# Patient Record
Sex: FEMALE | Race: ASIAN | NOT HISPANIC OR LATINO | Employment: UNEMPLOYED | URBAN - METROPOLITAN AREA
[De-identification: names, ages, dates, MRNs, and addresses within clinical notes are randomized per-mention and may not be internally consistent; named-entity substitution may affect disease eponyms.]

---

## 2017-01-24 ENCOUNTER — OFFICE VISIT (OUTPATIENT)
Dept: NEUROLOGY | Facility: CLINIC | Age: 42
End: 2017-01-24
Payer: COMMERCIAL

## 2017-01-24 VITALS
SYSTOLIC BLOOD PRESSURE: 96 MMHG | OXYGEN SATURATION: 99 % | DIASTOLIC BLOOD PRESSURE: 73 MMHG | WEIGHT: 117.3 LBS | BODY MASS INDEX: 21.59 KG/M2 | HEART RATE: 72 BPM | HEIGHT: 62 IN

## 2017-01-24 DIAGNOSIS — M79.2 NEUROPATHIC PAIN: Primary | ICD-10-CM

## 2017-01-24 DIAGNOSIS — S14.105D: ICD-10-CM

## 2017-01-24 PROCEDURE — 99212 OFFICE O/P EST SF 10 MIN: CPT | Performed by: PSYCHIATRY & NEUROLOGY

## 2017-01-24 RX ORDER — CEFUROXIME AXETIL 250 MG/1
250 TABLET ORAL
COMMUNITY
Start: 2017-01-16 | End: 2017-05-04

## 2017-01-24 ASSESSMENT — PAIN SCALES - GENERAL: PAINLEVEL: EXTREME PAIN (8)

## 2017-01-24 NOTE — PATIENT INSTRUCTIONS
Thank you for choosing [facility] in [place]. It was a pleasure to see you for your office visit today.     The following is a summary of your office visit:    Medication started today: None, Dr Day will talk with your cardiologist about restarting the Lidoderm Infusions.     Medication stopped today: None    Medication dose change: Decrease the Zonegran to 3 pills a day for 1 month and if doing well you can drop to 2 pills per day. If your pain increases you may go back up to original dose.     Appointments Made today: You are scheduled for a follow up with Dr Day in 4 months, this appt has been made for you.     Nurse/clinic contact information: [facility] [phone]    Further instructions for your care: Make sure that you make a follow up appt to see your Counselor. Call if your symptoms change or worsen.

## 2017-01-24 NOTE — MR AVS SNAPSHOT
After Visit Summary   1/24/2017    Kely Preston    MRN: [id number]           Patient Information     Date Of Birth          1975        Visit Information        Provider Department      1/24/2017 10:00 AM Fredi Day MD [facility]        Care Instructions    Thank you for choosing [facility] in [place]. It was a pleasure to see you for your office visit today.     The following is a summary of your office visit:    Medication started today: None, Dr Day will talk with your cardiologist about restarting the Lidoderm Patches.     Medication stopped today: None    Medication dose change: Decrease the Zonegran to 3 pills a day for 1 month and if doing well you can drop to 2 pills per day. If your pain increases you may go back up to original dose.     Appointments Made today: You are scheduled for a follow up with Dr Day in 4 months, this appt has been made for you.     Nurse/clinic contact information: [facility] [phone]    Further instructions for your care: Make sure that you make a follow up appt to see your Counselor. Call if your symptoms change or worsen.              Follow-ups after your visit        Your next 10 appointments already scheduled     May 23, 2017 10:00 AM   Return Visit with Fredi Day MD   [facility] ([facility])    [address] [place] [address] [phone]              Who to contact     If you have questions or need follow up information about today's clinic visit or your schedule please contact [facility] directly at [phone].  Normal or non-critical lab and imaging results will be communicated to you by MyChart, letter or phone within 4 business days after the clinic has received the results. If you do not hear from us within 7 days, please contact the clinic through MyChart or phone. If you have a critical or abnormal lab  "result, we will notify you by phone as soon as possible.  Submit refill requests through [employer] or call your pharmacy and they will forward the refill request to us. Please allow 3 business days for your refill to be completed.          Additional Information About Your Visit        [employer] Information     [employer] is an electronic gateway that provides easy, online access to your medical records. With [employer], you can request a clinic appointment, read your test results, renew a prescription or communicate with your care team.     To sign up for [employer] visit the website at [email or web address]   You will be asked to enter the access code listed below, as well as some personal information. Please follow the directions to create your username and password.     Your access code is: [email or web address]  Expires: 2017  5:03 PM     Your access code will  in 90 days. If you need help or a new code, please contact your [facility] or call [phone] for assistance.        Care EveryWhere ID     This is your Care EveryWhere ID. This could be used by other organizations to access your [place] medical records  [id number]        Your Vitals Were     Pulse Height BMI (Body Mass Index) Pulse Oximetry          72 5' 2\" (1.575 m) 21.45 kg/m2 99%         Blood Pressure from Last 3 Encounters:   17 96/73   12/15/16 102/68   11/15/16 106/72    Weight from Last 3 Encounters:   17 117 lb 4.8 oz (53.207 kg)   12/15/16 122 lb 8 oz (55.566 kg)   11/15/16 121 lb 8 oz (55.112 kg)              Today, you had the following     No orders found for display       Primary Care Provider Office Phone # Fax #    Kenneth G Pallas, [phone]       [facility] [id number]  [facility] [address]        Thank you!     Thank you for choosing [facility]  for your care. Our goal is always to provide you with excellent care. Hearing back " from our patients is one way we can continue to improve our services. Please take a few minutes to complete the written survey that you may receive in the mail after your visit with us. Thank you!             Your Updated Medication List - Protect others around you: Learn how to safely use, store and throw away your medicines at www.disposemymeds.org.          This list is accurate as of: 1/24/17 11:09 AM.  Always use your most recent med list.                   Brand Name Dispense Instructions for use    CEFTIN 250 MG tablet   Generic drug:  cefUROXime      Take 250 mg by mouth       DITROPAN XL 10 MG 24 hr tablet   Generic drug:  oxybutynin      Take by mouth daily       EFFEXOR XR 75 MG 24 hr capsule   Generic drug:  venlafaxine      Take 75 mg by mouth daily       gabapentin 300 MG capsule    NEURONTIN    360 capsule    Take 3 capsules (900 mg) by mouth 4 times daily       indomethacin 50 MG capsule    INDOCIN     Take 50 mg by mouth 2 times daily (with meals) Take up to 3 tablets per day for headache pain.       klonoPIN 0.5 MG tablet   Generic drug:  clonazePAM      Take 0.5 mg by mouth 2 times daily as needed       OXYBUTYNIN TD      Take 5 mg by mouth At Bedtime 5 mg at bedtime as needed.       oxyCODONE-acetaminophen  MG per tablet    PERCOCET     Take 1 tablet by mouth every 6 hours as needed for moderate to severe pain       propranolol 60 MG 24 hr capsule    INDERAL LA    30 capsule    Take 1 capsule (60 mg) by mouth daily       tiZANidine 4 MG tablet    ZANAFLEX    60 tablet    Take 1 tablet (4 mg) by mouth 2 times daily as needed       traZODone 150 MG tablet    DESYREL     Take 150 mg by mouth At Bedtime       zonisamide 100 MG capsule    ZONEGRAN    120 capsule    Take 4 capsules (400 mg) by mouth daily

## 2017-01-24 NOTE — NURSING NOTE
"Kely Pedersenearl's goals for this visit include: return  She requests these members of her care team be copied on today's visit information:     PCP: Pallas, Kenneth G    Referring Provider:  ESTABLISHED PATIENT  No address on file    Chief Complaint   Patient presents with     RECHECK       Initial BP 96/73 mmHg  Pulse 72  Ht 1.575 m (5' 2\")  Wt 53.207 kg (117 lb 4.8 oz)  BMI 21.45 kg/m2  SpO2 99% Estimated body mass index is 21.45 kg/(m^2) as calculated from the following:    Height as of this encounter: 1.575 m (5' 2\").    Weight as of this encounter: 53.207 kg (117 lb 4.8 oz).  BP completed using cuff size: regular      "

## 2017-01-24 NOTE — Clinical Note
"2017      RE: Kely Preston  [address] [place] [address]      2017            Kenneth Pallas, MD   [address] [address]      RE:  Kely Preston   MRN:  [id number]   :  1975      Dear Doctors:      I saw Kely \"Rema\" Kary in followup at the [facility] at [place].  As you know, I have followed her periodically for management of a neuropathic pain syndrome involving the right upper limb.  Since I saw her last in the spring of last year, symptoms have persisted and possibly worsened somewhat recently, due in part to the physical requirements of her employment as well as having discontinued her lidocaine infusions because of some cardiac symptoms.  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  She has been placed on a beta blocker which has helped with palpitations as well as migraine.  She has no clear new specific neuromuscular symptoms.      EXAMINATION:  Perception of light touch is reduced in the right upper extremity and right foot dorsum.  Pinprick perception is variably reduced, particularly in the right upper extremity, although it is better preserved over the lateral arm.  Vibration scores are 5 at the right malleolus and 5 at the left great toe.  Tone, bulk, strength and rapid repetitive movements are preserved.  Reflexes are symmetric and preserved except for modest relative reduction of the right biceps reflex and absence of the left triceps reflex.  Plantar responses are mute.      Kely's examination demonstrates no new concerning findings.  I will contact Dr. Amaya to determine whether she is cleared to resume intravenous lidocaine infusions.  As she is not confident that our increase her in Zonegran has been beneficial, I have suggested she begin to taper it back down to 200 mg daily.  She will return in 4 months.       "   Sincerely,      FREDI ABARCA MD             D: 2017 18:14   T: 2017 09:52   MT:       Name:     ALEXANDRE HUGGINS   MRN:      [id number]        Account:      [id number]   :      1975      Document: [id number]       cc: Andrew Will MD Constance Dankle DO Jessica Olson Heiring MD Kenneth Pallas MD     [id number]    Fredi Abarca MD

## 2017-01-25 NOTE — PROGRESS NOTES
"2017            Kenneth Pallas, MD   [facility]   [address] [place] [address]      RE:  Kely Preston   MRN:  [id number]   :  1975      Dear Doctors:      I saw Kely \"Rema\" Kary in followup at the [facility] [place].  As you know, I have followed her periodically for management of a neuropathic pain syndrome involving the right upper limb.  Since I saw her last in the spring of last year, symptoms have persisted and possibly worsened somewhat recently, due in part to the physical requirements of her employment as well as having discontinued her lidocaine infusions because of some cardiac symptoms.  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  She has been placed on a beta blocker which has helped with palpitations as well as migraine.  She has no clear new specific neuromuscular symptoms.      EXAMINATION:  Perception of light touch is reduced in the right upper extremity and right foot dorsum.  Pinprick perception is variably reduced, particularly in the right upper extremity, although it is better preserved over the lateral arm.  Vibration scores are 5 at the right malleolus and 5 at the left great toe.  Tone, bulk, strength and rapid repetitive movements are preserved.  Reflexes are symmetric and preserved except for modest relative reduction of the right biceps reflex and absence of the left triceps reflex.  Plantar responses are mute.      Kely's examination demonstrates no new concerning findings.  I will contact Dr. Amaya to determine whether she is cleared to resume intravenous lidocaine infusions.  As she is not confident that our increase her in Zonegran has been beneficial, I have suggested she begin to taper it back down to 200 mg daily.  She will return in 4 months.         Sincerely,      GIA ABARCA MD             D: 2017 18:14   T: 2017 09:52   " MT:       Name:     ALEXANDRE HUGGINS   MRN:      [id number]        Account:      [id number]   :      1975      Document: [id number]       cc: Andrew Will MD Constance Dankle DO Jessica Olson Heiring MD Kenneth Pallas MD

## 2017-02-06 ENCOUNTER — DOCUMENTATION ONLY (OUTPATIENT)
Dept: NEUROLOGY | Facility: CLINIC | Age: 42
End: 2017-02-06

## 2017-02-06 NOTE — PROGRESS NOTES
Received message from Dr Amaya that Kely Kary should NOT resume lidocaine infusions for now. Her office will arrange a cardiology followup appointment to discuss options. I will forward to coordinator and infusion center.

## 2017-02-06 NOTE — PROGRESS NOTES
The therapy plan was discontinued per Dr Day's request and the pt was notified that she should NOT proceed with the Lidocaine infusions. The pt verbalized understanding and had no further questions at this time.  Kely Hopkins RN

## 2017-05-03 DIAGNOSIS — F43.20 ADJUSTMENT DISORDER, UNSPECIFIED TYPE: ICD-10-CM

## 2017-05-03 DIAGNOSIS — R00.2 PALPITATIONS: ICD-10-CM

## 2017-05-03 DIAGNOSIS — G43.909 MIGRAINE WITHOUT STATUS MIGRAINOSUS, NOT INTRACTABLE, UNSPECIFIED MIGRAINE TYPE: ICD-10-CM

## 2017-05-03 RX ORDER — PROPRANOLOL HCL 60 MG
60 CAPSULE, EXTENDED RELEASE 24HR ORAL DAILY
Qty: 90 CAPSULE | Refills: 0 | Status: SHIPPED | OUTPATIENT
Start: 2017-05-03 | End: 2017-05-04

## 2017-05-03 NOTE — TELEPHONE ENCOUNTER
Received refill request for:  Propranolol  Last OV was: 12/15/2016 with Tejal HAINES  Labs/EKG: n/a  F/U scheduled: Entered orders in Epic for 6/2017 after reviewing Neurology note from 2/6/2017  New script sent to: [facility]

## 2017-05-04 ENCOUNTER — OFFICE VISIT (OUTPATIENT)
Dept: CARDIOLOGY | Facility: CLINIC | Age: 42
End: 2017-05-04
Attending: PHYSICIAN ASSISTANT
Payer: COMMERCIAL

## 2017-05-04 VITALS
HEIGHT: 62 IN | DIASTOLIC BLOOD PRESSURE: 80 MMHG | SYSTOLIC BLOOD PRESSURE: 108 MMHG | WEIGHT: 123 LBS | BODY MASS INDEX: 22.63 KG/M2 | HEART RATE: 60 BPM

## 2017-05-04 DIAGNOSIS — G43.909 MIGRAINE WITHOUT STATUS MIGRAINOSUS, NOT INTRACTABLE, UNSPECIFIED MIGRAINE TYPE: ICD-10-CM

## 2017-05-04 DIAGNOSIS — R00.2 PALPITATIONS: ICD-10-CM

## 2017-05-04 DIAGNOSIS — F43.20 ADJUSTMENT DISORDER, UNSPECIFIED TYPE: ICD-10-CM

## 2017-05-04 PROCEDURE — 99214 OFFICE O/P EST MOD 30 MIN: CPT | Performed by: INTERNAL MEDICINE

## 2017-05-04 RX ORDER — PROPRANOLOL HCL 60 MG
60 CAPSULE, EXTENDED RELEASE 24HR ORAL DAILY
Qty: 90 CAPSULE | Refills: 4 | Status: SHIPPED | OUTPATIENT
Start: 2017-05-04 | End: 2019-03-15

## 2017-05-04 NOTE — MR AVS SNAPSHOT
"              After Visit Summary   5/4/2017    Kely Preston    MRN: [id number]           Patient Information     Date Of Birth          1975        Visit Information        Provider Department      5/4/2017 1:45 PM Izabella Amaya DO [facility] [place]        Today's Diagnoses     Palpitations        Migraine without status migrainosus, not intractable, unspecified migraine type        Adjustment disorder, unspecified type           Follow-ups after your visit        Your next 10 appointments already scheduled     May 23, 2017 10:00 AM CDT   Return Visit with Fredi Day MD   [facility]    [address] [place] [address] [phone]              Who to contact     If you have questions or need follow up information about today's clinic visit or your schedule please contact [facility] [place] directly at [phone].  Normal or non-critical lab and imaging results will be communicated to you by MyChart, letter or phone within 4 business days after the clinic has received the results. If you do not hear from us within 7 days, please contact the clinic through MyChart or phone. If you have a critical or abnormal lab result, we will notify you by phone as soon as possible.  Submit refill requests through [email or web address] or call your pharmacy and they will forward the refill request to us. Please allow 3 business days for your refill to be completed.          Additional Information About Your Visit        [employer] Information     [email or web address] lets you send messages to your doctor, view your test results, renew your prescriptions, schedule appointments and more. To sign up, go to [email or web address] . Click on \"Log in\" on the left side of the screen, which will take you to the Welcome page. Then click on \"Sign up Now\" on the right side of the page.     You will be asked to " "enter the access code listed below, as well as some personal information. Please follow the directions to create your username and password.     Your access code is: [email or web address]  Expires: 2017  2:25 PM     Your access code will  in 90 days. If you need help or a new code, please call your [place] clinic or [phone].        Care EveryWhere ID     This is your Care EveryWhere ID. This could be used by other organizations to access your [place] medical records  [id number]        Your Vitals Were     Pulse Height BMI (Body Mass Index)             60 1.575 m (5' 2\") 22.5 kg/m2          Blood Pressure from Last 3 Encounters:   17 108/80   17 96/73   12/15/16 102/68    Weight from Last 3 Encounters:   17 55.8 kg (123 lb)   17 53.2 kg (117 lb 4.8 oz)   12/15/16 55.6 kg (122 lb 8 oz)              We Performed the Following     Follow-Up with Cardiologist          Where to get your medicines      These medications were sent to [facility] [address] [address]  [address] [address], [facility] [address]     Phone:  [phone]     propranolol 60 MG 24 hr capsule          Primary Care Provider Office Phone # Fax #    Kenneth G Pallas, [phone]       [facility] [address] [facility] [address]        Thank you!     Thank you for choosing [facility] [place]  for your care. Our goal is always to provide you with excellent care. Hearing back from our patients is one way we can continue to improve our services. Please take a few minutes to complete the written survey that you may receive in the mail after your visit with us. Thank you!             Your Updated Medication List - Protect others around you: Learn how to safely use, store and throw away your medicines at www.disposemymeds.org.          This list is accurate as of: 17  2:25 PM.  Always use your most recent " med list.                   Brand Name Dispense Instructions for use    DITROPAN XL 10 MG 24 hr tablet   Generic drug:  oxybutynin      Take by mouth daily       EFFEXOR XR 75 MG 24 hr capsule   Generic drug:  venlafaxine      Take 225 mg by mouth daily       gabapentin 300 MG capsule    NEURONTIN    360 capsule    Take 3 capsules (900 mg) by mouth 4 times daily       indomethacin 50 MG capsule    INDOCIN     Take 50 mg by mouth as needed Take up to 3 tablets per day for headache pain       klonoPIN 0.5 MG tablet   Generic drug:  clonazePAM      Take 0.5 mg by mouth 2 times daily as needed       OXYBUTYNIN TD      Take 5 mg by mouth At Bedtime 5 mg at bedtime as needed.       oxyCODONE-acetaminophen  MG per tablet    PERCOCET     Take 1 tablet by mouth every 6 hours as needed for moderate to severe pain       propranolol 60 MG 24 hr capsule    INDERAL LA    90 capsule    Take 1 capsule (60 mg) by mouth daily       tiZANidine 4 MG tablet    ZANAFLEX    60 tablet    Take 1 tablet (4 mg) by mouth 2 times daily as needed       traZODone 150 MG tablet    DESYREL     Take 150 mg by mouth At Bedtime

## 2017-05-04 NOTE — PROGRESS NOTES
HPI and Plan:   See dictation    No orders of the defined types were placed in this encounter.      Orders Placed This Encounter   Medications     propranolol (INDERAL LA) 60 MG 24 hr capsule     Sig: Take 1 capsule (60 mg) by mouth daily     Dispense:  90 capsule     Refill:  4       Medications Discontinued During This Encounter   Medication Reason     cefUROXime (CEFTIN) 250 MG tablet Therapy completed     zonisamide (ZONEGRAN) 100 MG capsule Discontinued by another Health Care Provider     propranolol (INDERAL LA) 60 MG 24 hr capsule Reorder         Encounter Diagnoses   Name Primary?     Palpitations      Migraine without status migrainosus, not intractable, unspecified migraine type      Adjustment disorder, unspecified type        CURRENT MEDICATIONS:  Current Outpatient Prescriptions   Medication Sig Dispense Refill     propranolol (INDERAL LA) 60 MG 24 hr capsule Take 1 capsule (60 mg) by mouth daily 90 capsule 4     venlafaxine (EFFEXOR XR) 75 MG 24 hr capsule Take 225 mg by mouth daily        gabapentin (NEURONTIN) 300 MG capsule Take 3 capsules (900 mg) by mouth 4 times daily 360 capsule 5     OXYBUTYNIN TD Take 5 mg by mouth At Bedtime 5 mg at bedtime as needed.       oxyCODONE-acetaminophen (PERCOCET)  MG per tablet Take 1 tablet by mouth every 6 hours as needed for moderate to severe pain       oxybutynin (DITROPAN XL) 10 MG 24 hr tablet Take by mouth daily       tiZANidine (ZANAFLEX) 4 MG tablet Take 1 tablet (4 mg) by mouth 2 times daily as needed 60 tablet 1     clonazePAM (KLONOPIN) 0.5 MG tablet Take 0.5 mg by mouth 2 times daily as needed        indomethacin (INDOCIN) 50 MG capsule Take 50 mg by mouth as needed Take up to 3 tablets per day for headache pain       [DISCONTINUED] propranolol (INDERAL LA) 60 MG 24 hr capsule Take 1 capsule (60 mg) by mouth daily 90 capsule 0     traZODone (DESYREL) 150 MG tablet Take 150 mg by mouth At Bedtime         ALLERGIES     Allergies   Allergen  "Reactions     Topamax [Topiramate]      shaky       PAST MEDICAL HISTORY:  Past Medical History:   Diagnosis Date     Anxiety      Chronic pain syndrome      Depressive disorder      Irregular heart beat      Migraine      Myelomalacia (H)      Neuropathy (H)      Other chronic pain      Unspecified site of spinal cord injury without evidence of spinal bone injury        PAST SURGICAL HISTORY:  Past Surgical History:   Procedure Laterality Date     CARDIAC SURGERY      ablation for arrhythmia     GYN SURGERY      c section x2     IMPLANT STIMULATOR AND LEADS SACRAL NERVE (STAGE ONE AND TWO)  7/30/2014    Procedure: IMPLANT STIMULATOR AND LEADS SACRAL NERVE (STAGE ONE AND TWO);  Surgeon: Austin Newsome MD;  Location: [facility]     ORTHOPEDIC SURGERY      carpal tunnel release, dequervains     SOFT TISSUE SURGERY      liposuction       FAMILY HISTORY:  No family history on file.    SOCIAL HISTORY:  Social History     Social History     Marital status:      Spouse name: N/A     Number of children: N/A     Years of education: N/A     Social History Main Topics     Smoking status: Former Smoker     Smokeless tobacco: Never Used     Alcohol use Yes      Comment: very occasionally     Drug use: No     Sexual activity: Not Asked     Other Topics Concern     Special Diet No     Exercise Yes     PT daily for spinal injury      Social History Narrative       Review of Systems:  Skin:  Negative       Eyes:  Negative      ENT:  Negative      Respiratory:  Negative       Cardiovascular:    Positive for;palpitations;fatigue occas. palpitations - mostly late at pm's and early am's - per pt   Gastroenterology: Negative      Genitourinary:  Positive for   \"tiny leakage\" due to nerve damage  Musculoskeletal:  Positive for   shooting pain in right arm, and right leg - d/t spinal cord injury ,  throbbing and numbness iin right arm  Neurologic:  Positive for numbness or tingling of hands;migraine headaches    Psychiatric:  " "Positive for sleep disturbances;depression;anxiety;excessive stress    Heme/Lymph/Imm:  Positive for allergies Topamax   Endocrine:  Negative        Physical Exam:  Vitals: /80 (BP Location: Left arm, Patient Position: Chair, Cuff Size: Adult Regular)  Pulse 60  Ht 1.575 m (5' 2\")  Wt 55.8 kg (123 lb)  BMI 22.5 kg/m2    Constitutional:           Skin:           Head:           Eyes:           ENT:           Neck:           Chest:             Cardiac:                    Abdomen:           Vascular:                                          Extremities and Back:                 Neurological:                 CC  Danny Brownlee PA-C   [facility]  [address] [place] [address]                  "

## 2017-05-04 NOTE — LETTER
5/4/2017    Kenneth G. Pallas, MD  [facility]   [address]  [facility] [address]    RE: Kely Preston       Dear Colleague,    I had the pleasure of seeing Kely Preston in the [facility].    Ms. Preston is a pleasant 41-year-old female with a history of SVT and PVCs with symptoms of palpitations.  She has been placed on a low dose of Inderal with significant improvement in her symptoms.  She also has a history of neuropathic pain syndrome in her right upper extremity for which she was receiving lidocaine infusions.  She did have some bradycardic episodes with her heart rate in the high 30s in the mornings noted on ZIO Patch, and therefore it was recommended not to continue combination Inderal with lidocaine infusions.        She comes in today.  I did remind her that we also use propranolol for her for suppression of migraine headaches; that is why we chose this particular beta blocker as it crosses the blood-brain barrier.  She has noted a significant reduction in her frequency and severity of migraines as well as a reduction in her palpitation symptoms.  She mainly feels palpitations when she is resting at night, but she said that they are nowhere near the severity they used to be before propranolol.  She has not noted any side effect with the propranolol, specifically, we were concerned about her low blood pressures and lightheadedness and increased fatigue.  She is not experiencing those side effects.      PHYSICAL EXAMINATION:   VITAL SIGNS:  On exam today, her blood pressure was measured at 108/80, pulse is 60, weight 123 with a body mass index of 22.   CARDIOVASCULAR:  Tones are regular today.  I do not appreciate a murmur, gallop or rub.   LUNGS:  Clear posteriorly.   EXTREMITIES:  She has strong and symmetric pulses in the upper and lower extremities without peripheral edema.     Outpatient Encounter Prescriptions as of 5/4/2017   Medication  Sig Dispense Refill     propranolol (INDERAL LA) 60 MG 24 hr capsule Take 1 capsule (60 mg) by mouth daily 90 capsule 4     venlafaxine (EFFEXOR XR) 75 MG 24 hr capsule Take 225 mg by mouth daily        [DISCONTINUED] gabapentin (NEURONTIN) 300 MG capsule Take 3 capsules (900 mg) by mouth 4 times daily 360 capsule 5     OXYBUTYNIN TD Take 5 mg by mouth At Bedtime 5 mg at bedtime as needed.       oxyCODONE-acetaminophen (PERCOCET)  MG per tablet Take 1 tablet by mouth every 6 hours as needed for moderate to severe pain       oxybutynin (DITROPAN XL) 10 MG 24 hr tablet Take by mouth daily       tiZANidine (ZANAFLEX) 4 MG tablet Take 1 tablet (4 mg) by mouth 2 times daily as needed 60 tablet 1     clonazePAM (KLONOPIN) 0.5 MG tablet Take 0.5 mg by mouth 2 times daily as needed        indomethacin (INDOCIN) 50 MG capsule Take 50 mg by mouth as needed Take up to 3 tablets per day for headache pain       [DISCONTINUED] propranolol (INDERAL LA) 60 MG 24 hr capsule Take 1 capsule (60 mg) by mouth daily 90 capsule 0     [DISCONTINUED] cefUROXime (CEFTIN) 250 MG tablet Take 250 mg by mouth       [DISCONTINUED] zonisamide (ZONEGRAN) 100 MG capsule Take 4 capsules (400 mg) by mouth daily 120 capsule 2     traZODone (DESYREL) 150 MG tablet Take 150 mg by mouth At Bedtime       No facility-administered encounter medications on file as of 5/4/2017.       In summary, Ms. Preston is a very pleasant 41-year-old female with a history of palpitations, SVT and PVCs and migraine headaches as well as chronic neuropathic pain of the right upper limb.  She has received significant improvement in her palpitation symptoms as well as migraines with the use of propranolol.  She is continuing to struggle with the neuropathic pain in her right upper extremity.  We talked about other options for her palpitation symptoms should she decide that she wants to go back to lidocaine infusions.  I would suggest repeating a basic metabolic panel and  thyroid studies if this has not been done.  She does believe that she has had blood work drawn at [facility] within the last year.  I would consider magnesium supplementation for her palpitations.  Another option is to meet with Electrophysiology to see if they have any other ideas for reduction in her palpitation symptoms.      At this time, she is happy with the results of the Inderal and will explore other options for her right upper extremity.  She has agreed to contact me should she decide that she wants to return using lidocaine infusions for her neuropathic pain.  Please feel free to contact me with any questions you have in regards to her care.      Again, thank you for allowing me to participate in the care of your patient.      Sincerely,    Izabella Amaya, DO

## 2017-05-05 NOTE — PROGRESS NOTES
HISTORY OF PRESENT ILLNESS:  Ms. Preston is a pleasant 41-year-old female with a history of SVT and PVCs with symptoms of palpitations.  She has been placed on a low dose of Inderal with significant improvement in her symptoms.  She also has a history of neuropathic pain syndrome in her right upper extremity for which she was receiving lidocaine infusions.  She did have some bradycardic episodes with her heart rate in the high 30s in the mornings noted on ZIO Patch, and therefore it was recommended not to continue combination Inderal with lidocaine infusions.        She comes in today.  I did remind her that we also use propranolol for her for suppression of migraine headaches; that is why we chose this particular beta blocker as it crosses the blood-brain barrier.  She has noted a significant reduction in her frequency and severity of migraines as well as a reduction in her palpitation symptoms.  She mainly feels palpitations when she is resting at night, but she said that they are nowhere near the severity they used to be before propranolol.  She has not noted any side effect with the propranolol, specifically, we were concerned about her low blood pressures and lightheadedness and increased fatigue.  She is not experiencing those side effects.      PHYSICAL EXAMINATION:   VITAL SIGNS:  On exam today, her blood pressure was measured at 108/80, pulse is 60, weight 123 with a body mass index of 22.   CARDIOVASCULAR:  Tones are regular today.  I do not appreciate a murmur, gallop or rub.   LUNGS:  Clear posteriorly.   EXTREMITIES:  She has strong and symmetric pulses in the upper and lower extremities without peripheral edema.      In summary, Ms. Preston is a very pleasant 41-year-old female with a history of palpitations, SVT and PVCs and migraine headaches as well as chronic neuropathic pain of the right upper limb.  She has received significant improvement in her palpitation symptoms as well as migraines with the  use of propranolol.  She is continuing to struggle with the neuropathic pain in her right upper extremity.  We talked about other options for her palpitation symptoms should she decide that she wants to go back to lidocaine infusions.  I would suggest repeating a basic metabolic panel and thyroid studies if this has not been done.  She does believe that she has had blood work drawn at [facility] within the last year.  I would consider magnesium supplementation for her palpitations.  Another option is to meet with Electrophysiology to see if they have any other ideas for reduction in her palpitation symptoms.      At this time, she is happy with the results of the Inderal and will explore other options for her right upper extremity.  She has agreed to contact me should she decide that she wants to return using lidocaine infusions for her neuropathic pain.  Please feel free to contact me with any questions you have in regards to her care.      cc:      Kenneth Pallas, MD    [facility]   [address] [place] [address]         MIRIAN GARRISON DO             D: 2017 14:24   T: 2017 11:22   MT: [id number]      Name:     ALEXANDRE HUGGINS   MRN:      [id number]        Account:      [id number]   :      1975           Service Date: 2017      Document: [id number]

## 2017-05-23 ENCOUNTER — OFFICE VISIT (OUTPATIENT)
Dept: NEUROLOGY | Facility: CLINIC | Age: 42
End: 2017-05-23
Payer: COMMERCIAL

## 2017-05-23 ENCOUNTER — TRANSFERRED RECORDS (OUTPATIENT)
Dept: HEALTH INFORMATION MANAGEMENT | Facility: CLINIC | Age: 42
End: 2017-05-23

## 2017-05-23 VITALS
HEART RATE: 103 BPM | BODY MASS INDEX: 22.25 KG/M2 | DIASTOLIC BLOOD PRESSURE: 71 MMHG | SYSTOLIC BLOOD PRESSURE: 119 MMHG | OXYGEN SATURATION: 98 % | WEIGHT: 120.9 LBS | HEIGHT: 62 IN

## 2017-05-23 DIAGNOSIS — M79.2 NEUROPATHIC PAIN: Primary | ICD-10-CM

## 2017-05-23 DIAGNOSIS — M79.2 NEUROPATHIC PAIN: ICD-10-CM

## 2017-05-23 LAB — PHQ9 SCORE: 16

## 2017-05-23 PROCEDURE — 99214 OFFICE O/P EST MOD 30 MIN: CPT | Mod: GC | Performed by: PSYCHIATRY & NEUROLOGY

## 2017-05-23 RX ORDER — GABAPENTIN 300 MG/1
900 CAPSULE ORAL 4 TIMES DAILY
Qty: 360 CAPSULE | Refills: 5 | Status: SHIPPED | OUTPATIENT
Start: 2017-05-23 | End: 2018-03-30

## 2017-05-23 ASSESSMENT — PAIN SCALES - GENERAL: PAINLEVEL: SEVERE PAIN (7)

## 2017-05-23 NOTE — TELEPHONE ENCOUNTER
[facility] Call Center    Phone Message    Name of Caller: Kely    Phone Number: Home number on file [phone] (home)    Best time to return call: Any    May a detailed message be left on voicemail: yes    Relation to patient: Self    Reason for Call: Medication Refill Request    Has the patient contacted the pharmacy for the refill? Yes   Name of medication being requested: gabapentin (NEURONTIN) 300 MG capsule [6544] (Order [id number])    Provider who prescribed the medication: Dr. Day  Pharmacy: [facility]  Date medication is needed: ASAP. Patient only has 1 day left and was seen by Dr. Day this morning.          Action Taken: Message routed to:  Adult Clinics: Neurology p [id number]

## 2017-05-23 NOTE — PROGRESS NOTES
[facility]: [facility]  Neurology Follow Up    Patient Name:  Kely Preston  MRN:  [id number]    :  1975  Date of Service:  May 23, 2017        History of Present Illness:   Kely Preston is a 41 year old female who presents for follow up regarding her chronic neuropathic pain syndrome involving the right upper and right lower extremity.  She had a complication of an epidural steroid injection in the cervical region for migraine managment, ultimately leading to a lesion.  The symptoms she developed initially were persistent sensory alteration in the fingertip, hand, and right upper limb along with deep ache, and shooting pain.  She also has developed shooting pain in her right hip to leg.    Interval History:  The patient has weaned off of her Zonegran with no major changes in status.  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  She still has some degree of hand cramping and over gripping when using her mouse with her right hand, but otherwise works at a desk without major symptoms.  Her pain is still a 7/10 in her R-arm, with throbbing and shooting reported and a loss of most sensation in the hand to mid-arm.  She is still having some degree of right hip to leg shooting pain, but without a great deal of throbbing reported.  She is no longer on lidocaine due to cardiac complications.    Her insurance has switched, and she is no longer able to afford botox due to this.  She is inquiring about whether the neuropathy clinic would take over her indomethacin script.  She is also curious as to whether we would need to have any repeat imaging soon.    ROS: A 10-point ROS is negative except as noted above.      Medications:    Current Outpatient Prescriptions   Medication     propranolol (INDERAL LA) 60 MG 24 hr capsule     venlafaxine (EFFEXOR XR) 75 MG 24 hr capsule      "gabapentin (NEURONTIN) 300 MG capsule     OXYBUTYNIN TD     oxyCODONE-acetaminophen (PERCOCET)  MG per tablet     oxybutynin (DITROPAN XL) 10 MG 24 hr tablet     traZODone (DESYREL) 150 MG tablet     tiZANidine (ZANAFLEX) 4 MG tablet     clonazePAM (KLONOPIN) 0.5 MG tablet     indomethacin (INDOCIN) 50 MG capsule     Neurologic Examination:    Vitals: /71  Pulse 103  Ht 1.575 m (5' 2\")  Wt 54.8 kg (120 lb 14.4 oz)  SpO2 98%  BMI 22.11 kg/m2  General: Cooperative, NAD, well appearing and in a pleasant disposition  Cardiac: no abnormal rhythm present, pulse is regular  Neurologic:     Mental Status: Fully alert, attentive and oriented. Speech clear and fluent, comprehension intact     Cranial Nerves: Visual fields intact. PERRL. EOMI with normal smooth pursuit, no diplopia or nystagmus. Facial sensation intact/symmetric. Facial movements symmetric. Hearing intact to conversation. Palate elevation symmetric, uvula midline. No dysarthria. Shoulder shrug strong bilaterally. Tongue protrusion midline.     Motor: Full strength in UE and LE b/l in all motions. No increased bulk or tone throughout.     Deep Tendon Reflexes: 2+/symmetric throughout. No clonus. Toes downgoing.     Sensory: Light touch, pinprick, vibratory and proprioceptive sensation intact/symmetric throughout Left UE and LE.  The Right side upper extremity has variably reduced light touch sensation from the mid-arm distally, pain sensations dulled or absent mid-arm distally, and no vibration sense on the distal fingers.  Her Right lower extremity has some minimal light touch sensory loss on the dorsal midline aspect of her foot, and absent vibration at her great toe.     Coordination: FNF and HS intact without dysmetria. Franca within normal limits.     Station/Gait:steady casual gait. She crosses her right leg when in swing phase across the midline constantly. Able to walk on toes, heels and tandem without difficulty.      Impression:  41F " with neuropathic pain syndrome involving the Right upper limb and the right thigh and leg.  Her symptoms are relatively stable as per examination today, and she is making progress with movement of her hand.  She is making excellent work at changing lifestyle (new job, cushioned soles for shoes), and we feel this will only help her physical symptoms.  Since her symptoms haven't changed, we would avoid further imaging at this time as the spinal cord lesion is likely stable. .  Regarding her prior lidocaine infusions, due to her benign PVCs she is now on propranolol which is helping both this and her migraine symptoms.   She is at liberty to discuss ketamine infusions with her cardiologist. Lidocaine is now relatively contraindicated. The patient was also interest in haptic feedback trials done at the [facility] and we will investigate into her meeting the criteria for this study.    Recommendations:   - Follow up PRN or 1 year if patient feels it is indicated  - Continue current management with headache specialist in regards to indomethacin, tizanidine, and botox treatments  - Consider ketamine infusions if cardiology is in agreement and pain management provider feels it is indicated  - Consider [facility] trial of haptic feedback         Patient was seen and discussed with attending neurologist, Dr. Barb MD.  He is in agreement with the assessment and plan.    PORTIA Rosen.  PGY3 Neurology  [phone]    I personally examined the patient. Except where indicated, the resident's note reflects my findings.    Fredi Day M.D.

## 2017-05-23 NOTE — NURSING NOTE
"Kely Preston's goals for this visit include: return  She requests these members of her care team be copied on today's visit information:     PCP: Pallas, Kenneth G    Referring Provider:  No referring provider defined for this encounter.    Chief Complaint   Patient presents with     RECHECK       Initial /71  Pulse 103  Ht 1.575 m (5' 2\")  Wt 54.8 kg (120 lb 14.4 oz)  SpO2 98%  BMI 22.11 kg/m2 Estimated body mass index is 22.11 kg/(m^2) as calculated from the following:    Height as of this encounter: 1.575 m (5' 2\").    Weight as of this encounter: 54.8 kg (120 lb 14.4 oz).  Medication Reconciliation: complete    Do you need any medication refills at today's visit? y  "

## 2017-05-23 NOTE — MR AVS SNAPSHOT
After Visit Summary   5/23/2017    Kely Preston    MRN: [id number]           Patient Information     Date Of Birth          1975        Visit Information        Provider Department      5/23/2017 10:00 AM Fredi Day MD [facility]        Care Instructions    Thank you for choosing [facility] in [place]. It was a pleasure to see you for your office visit today.     The following is a summary of your office visit:    Medication started today: None    Medication stopped today: None    Medication dose change: None    Appointments Made today: A 1 year follow up appt with Dr Day was made for you.     Nurse/clinic contact information: [facility] [phone]    Further instructions for your care: Call if your symptoms change or worsen. Talk with your cardiologist about the IV Ketamine.              Follow-ups after your visit        Your next 10 appointments already scheduled     May 15, 2018 11:00 AM CDT   Return Visit with Fredi Day MD   [facility] ([facility]    [address] [place] [id number] [phone]              Who to contact     If you have questions or need follow up information about today's clinic visit or your schedule please contact [facility] directly at [phone].  Normal or non-critical lab and imaging results will be communicated to you by MyChart, letter or phone within 4 business days after the clinic has received the results. If you do not hear from us within 7 days, please contact the clinic through MyChart or phone. If you have a critical or abnormal lab result, we will notify you by phone as soon as possible.  Submit refill requests through [employer] or call your pharmacy and they will forward the refill request to us. Please allow 3 business days for your refill to be completed.          Additional Information About Your Visit       "  MyChart Information     [employer] is an electronic gateway that provides easy, online access to your medical records. With [employer], you can request a clinic appointment, read your test results, renew a prescription or communicate with your care team.     To sign up for [employer] visit the website at [email or web address]   You will be asked to enter the access code listed below, as well as some personal information. Please follow the directions to create your username and password.     Your access code is: [email or web address]  Expires: 2017  2:25 PM     Your access code will  in 90 days. If you need help or a new code, please contact your [facility] or call [phone] for assistance.        Care EveryWhere ID     This is your Care EveryWhere ID. This could be used by other organizations to access your [facility] medical records  [id number]        Your Vitals Were     Pulse Height Pulse Oximetry BMI (Body Mass Index)          103 5' 2\" (1.575 m) 98% 22.11 kg/m2         Blood Pressure from Last 3 Encounters:   17 119/71   17 108/80   17 96/73    Weight from Last 3 Encounters:   17 120 lb 14.4 oz (54.8 kg)   17 123 lb (55.8 kg)   17 117 lb 4.8 oz (53.2 kg)              Today, you had the following     No orders found for display       Primary Care Provider Office Phone # Fax #    Kenneth G Pallas, [phone]       [facility] [address] [facility] [address]        Thank you!     Thank you for choosing [facility]  for your care. Our goal is always to provide you with excellent care. Hearing back from our patients is one way we can continue to improve our services. Please take a few minutes to complete the written survey that you may receive in the mail after your visit with us. Thank you!             Your Updated Medication List - Protect others around you: Learn how to safely use, " store and throw away your medicines at www.disposemymeds.org.          This list is accurate as of: 5/23/17 11:52 AM.  Always use your most recent med list.                   Brand Name Dispense Instructions for use    DITROPAN XL 10 MG 24 hr tablet   Generic drug:  oxybutynin      Take by mouth daily       EFFEXOR XR 75 MG 24 hr capsule   Generic drug:  venlafaxine      Take 225 mg by mouth daily       gabapentin 300 MG capsule    NEURONTIN    360 capsule    Take 3 capsules (900 mg) by mouth 4 times daily       indomethacin 50 MG capsule    INDOCIN     Take 50 mg by mouth as needed Take up to 3 tablets per day for headache pain       klonoPIN 0.5 MG tablet   Generic drug:  clonazePAM      Take 0.5 mg by mouth 2 times daily as needed       OXYBUTYNIN TD      Take 5 mg by mouth At Bedtime 5 mg at bedtime as needed.       oxyCODONE-acetaminophen  MG per tablet    PERCOCET     Take 1 tablet by mouth every 6 hours as needed for moderate to severe pain       propranolol 60 MG 24 hr capsule    INDERAL LA    90 capsule    Take 1 capsule (60 mg) by mouth daily       tiZANidine 4 MG tablet    ZANAFLEX    60 tablet    Take 1 tablet (4 mg) by mouth 2 times daily as needed       traZODone 150 MG tablet    DESYREL     Take 150 mg by mouth At Bedtime

## 2017-05-23 NOTE — LETTER
May 23, 2017      RE: Kely Preston  [address] [place] [address]      [facility]: [facility]  Neurology Follow Up    Patient Name:  Kely Preston  MRN:  [id number]    :  1975  Date of Service:  May 23, 2017        History of Present Illness:   Kely Preston is a 41 year old female who presents for follow up regarding her chronic neuropathic pain syndrome involving the right upper and right lower extremity.  She had a complication of an epidural steroid injection in the cervical region for migraine managment, ultimately leading to a lesion.  The symptoms she developed initially were persistent sensory alteration in the fingertip, hand, and right upper limb along with deep ache, and shooting pain.  She also has developed shooting pain in her right hip to leg.    Interval History:  The patient has weaned off of her Zonegran with no major changes in status.  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  She still has some degree of hand cramping and over gripping when using her mouse with her right hand, but otherwise works at a desk without major symptoms.  Her pain is still a 7/10 in her R-arm, with throbbing and shooting reported and a loss of most sensation in the hand to mid-arm.  She is still having some degree of right hip to leg shooting pain, but without a great deal of throbbing reported.  She is no longer on lidocaine due to cardiac complications.    Her insurance has switched, and she is no longer able to afford botox due to this.  She is inquiring about whether the neuropathy clinic would take over her indomethacin script.  She is also curious as to whether we would need to have any repeat imaging soon.    ROS: A 10-point ROS is negative except as noted above.      Medications:    Current Outpatient Prescriptions   Medication     propranolol  "(INDERAL LA) 60 MG 24 hr capsule     venlafaxine (EFFEXOR XR) 75 MG 24 hr capsule     gabapentin (NEURONTIN) 300 MG capsule     OXYBUTYNIN TD     oxyCODONE-acetaminophen (PERCOCET)  MG per tablet     oxybutynin (DITROPAN XL) 10 MG 24 hr tablet     traZODone (DESYREL) 150 MG tablet     tiZANidine (ZANAFLEX) 4 MG tablet     clonazePAM (KLONOPIN) 0.5 MG tablet     indomethacin (INDOCIN) 50 MG capsule     Neurologic Examination:    Vitals: /71  Pulse 103  Ht 1.575 m (5' 2\")  Wt 54.8 kg (120 lb 14.4 oz)  SpO2 98%  BMI 22.11 kg/m2  General: Cooperative, NAD, well appearing and in a pleasant disposition  Cardiac: no abnormal rhythm present, pulse is regular  Neurologic:     Mental Status: Fully alert, attentive and oriented. Speech clear and fluent, comprehension intact     Cranial Nerves: Visual fields intact. PERRL. EOMI with normal smooth pursuit, no diplopia or nystagmus. Facial sensation intact/symmetric. Facial movements symmetric. Hearing intact to conversation. Palate elevation symmetric, uvula midline. No dysarthria. Shoulder shrug strong bilaterally. Tongue protrusion midline.     Motor: Full strength in UE and LE b/l in all motions. No increased bulk or tone throughout.     Deep Tendon Reflexes: 2+/symmetric throughout. No clonus. Toes downgoing.     Sensory: Light touch, pinprick, vibratory and proprioceptive sensation intact/symmetric throughout Left UE and LE.  The Right side upper extremity has variably reduced light touch sensation from the mid-arm distally, pain sensations dulled or absent mid-arm distally, and no vibration sense on the distal fingers.  Her Right lower extremity has some minimal light touch sensory loss on the dorsal midline aspect of her foot, and absent vibration at her great toe.     Coordination: FNF and HS intact without dysmetria. Franca within normal limits.     Station/Gait:steady casual gait. She crosses her right leg when in swing phase across the midline " constantly. Able to walk on toes, heels and tandem without difficulty.      Impression:  41F with neuropathic pain syndrome involving the Right upper limb and the right thigh and leg.  Her symptoms are relatively stable as per examination today, and she is making progress with movement of her hand.  She is making excellent work at changing lifestyle (new job, cushioned soles for shoes), and we feel this will only help her physical symptoms.  Since her symptoms haven't changed, we would avoid further imaging at this time as the myelodysplasia is unlikely to be expanding such that it needs further monitoring.  Regarding her prior lidocaine infusions, due to her benign PVCs she is now on propranolol which is helping both this and her migraine symptoms.  Due to the cardiac symptoms, we still wouldn't recommend further lidocaine use but there may be other alternatives in the area, including ketamine infusion, that may be helpful.  We would like cardiology to weigh in on this treatment option, regarding her cardiac issues, and whether it would be safe to proceed in this direction.  The patient was also interest in haptic feedback trials done at the [facility] and we will investigate into her meeting the criteria for this study    Recommendations:   - Follow up in one year  - Continue current management with headache specialist in regards to indomethacin, tizanidine, and botox treatments  - Consider ketamine infusions if cardiology is in agreement  - Consider [facility] trial of haptic feedback         Patient was seen and discussed with attending neurologist, Dr. Barb MD.  He is in agreement with the assessment and plan.    PORTIA Rosen.  PGY3 Neurology  [phone]        Fredi Day MD

## 2017-05-23 NOTE — PATIENT INSTRUCTIONS
Thank you for choosing [facility] in [place]. It was a pleasure to see you for your office visit today.     The following is a summary of your office visit:    Medication started today: None    Medication stopped today: None    Medication dose change: None    Appointments Made today: A 1 year follow up appt with Dr Day was made for you.     Nurse/clinic contact information: [facility] [phone]    Further instructions for your care: Call if your symptoms change or worsen. Talk with your cardiologist about the IV Ketamine.

## 2018-03-02 ENCOUNTER — TELEPHONE (OUTPATIENT)
Dept: CARDIOLOGY | Facility: CLINIC | Age: 43
End: 2018-03-02

## 2018-03-02 NOTE — TELEPHONE ENCOUNTER
"Pt called to schedule appt w/Dr. Amaya and was transferred to nursing line by scheduling. Pt is scheduled for office visit on 3/6/18 at 2:45pm. Pt reports she has been feeling frequent skipped beats that are bothering her and at times feels a racing heart beat. She said the skipped beats at times make her feel lightheaded. Pt had a monitor in 2016 that showed PACs, PVCs and has a hx of SVT ablation at age 17. Verified pt is taking her propranolol 60mg as prescribed. Advised pt to go to ED if she experiences syncope, chest pain or severe SOB prior to her appt. Advised pt to rest, and avoid caffeine and alcohol, try vagal maneuvers if she feels like she is in SVT. Pt verbalized understanding, stated she has been consuming \"probably too much\" caffeine and she will stop this and see if she feels better.   "

## 2018-03-22 ENCOUNTER — OFFICE VISIT (OUTPATIENT)
Dept: CARDIOLOGY | Facility: CLINIC | Age: 43
End: 2018-03-22
Payer: COMMERCIAL

## 2018-03-22 VITALS
HEIGHT: 62 IN | SYSTOLIC BLOOD PRESSURE: 116 MMHG | WEIGHT: 135.1 LBS | HEART RATE: 84 BPM | DIASTOLIC BLOOD PRESSURE: 76 MMHG | BODY MASS INDEX: 24.86 KG/M2

## 2018-03-22 DIAGNOSIS — R00.2 PALPITATIONS: ICD-10-CM

## 2018-03-22 DIAGNOSIS — I47.10 SVT (SUPRAVENTRICULAR TACHYCARDIA) (H): Primary | ICD-10-CM

## 2018-03-22 DIAGNOSIS — G43.909 MIGRAINE WITHOUT STATUS MIGRAINOSUS, NOT INTRACTABLE, UNSPECIFIED MIGRAINE TYPE: ICD-10-CM

## 2018-03-22 DIAGNOSIS — F43.20 ADJUSTMENT DISORDER, UNSPECIFIED TYPE: ICD-10-CM

## 2018-03-22 PROCEDURE — 99214 OFFICE O/P EST MOD 30 MIN: CPT | Performed by: INTERNAL MEDICINE

## 2018-03-22 RX ORDER — PROPRANOLOL HYDROCHLORIDE 60 MG/1
60 TABLET ORAL 2 TIMES DAILY
Qty: 180 TABLET | Refills: 3 | Status: ON HOLD | OUTPATIENT
Start: 2018-03-22 | End: 2019-10-30

## 2018-03-22 NOTE — LETTER
3/22/2018    Kenneth G. Pallas, MD  [facility] [address]  [facility] [address]    RE: Kely Preston       Dear Colleague,    I had the pleasure of seeing Kely Preston in the [facility].    HPI and Plan:   See dictation    No orders of the defined types were placed in this encounter.      Orders Placed This Encounter   Medications     propranolol HCl 60 MG TABS     Sig: Take 1 tablet (60 mg) by mouth 2 times daily     Dispense:  180 tablet     Refill:  3       Medications Discontinued During This Encounter   Medication Reason     OXYBUTYNIN TD Medication Reconciliation Clean Up         Encounter Diagnoses   Name Primary?     Palpitations      Migraine without status migrainosus, not intractable, unspecified migraine type      Adjustment disorder, unspecified type      h/o SVT (supraventricular tachycardia) s/p ablation  Yes       CURRENT MEDICATIONS:  Current Outpatient Prescriptions   Medication Sig Dispense Refill     propranolol HCl 60 MG TABS Take 1 tablet (60 mg) by mouth 2 times daily 180 tablet 3     gabapentin (NEURONTIN) 300 MG capsule Take 3 capsules (900 mg) by mouth 4 times daily 360 capsule 5     propranolol (INDERAL LA) 60 MG 24 hr capsule Take 1 capsule (60 mg) by mouth daily 90 capsule 4     venlafaxine (EFFEXOR XR) 75 MG 24 hr capsule Take 150 mg by mouth daily        oxyCODONE-acetaminophen (PERCOCET)  MG per tablet Take 1 tablet by mouth every 6 hours as needed for moderate to severe pain       oxybutynin (DITROPAN XL) 10 MG 24 hr tablet Take 10 mg by mouth 2 times daily        traZODone (DESYREL) 150 MG tablet Take  mg by mouth At Bedtime        tiZANidine (ZANAFLEX) 4 MG tablet Take 1 tablet (4 mg) by mouth 2 times daily as needed 60 tablet 1     clonazePAM (KLONOPIN) 0.5 MG tablet Take 0.5 mg by mouth 2 times daily as needed        indomethacin (INDOCIN) 50 MG capsule Take 50 mg by mouth as needed Take up to 3  "tablets per day for headache pain         ALLERGIES     Allergies   Allergen Reactions     Topamax [Topiramate]      emma       PAST MEDICAL HISTORY:  Past Medical History:   Diagnosis Date     Anxiety      Chronic pain syndrome      Depressive disorder      Irregular heart beat      Migraine      Myelomalacia (H)      Neuropathy      Other chronic pain      Unspecified site of spinal cord injury without evidence of spinal bone injury        PAST SURGICAL HISTORY:  Past Surgical History:   Procedure Laterality Date     CARDIAC SURGERY      ablation for arrhythmia     GYN SURGERY      c section x2     IMPLANT STIMULATOR AND LEADS SACRAL NERVE (STAGE ONE AND TWO)  7/30/2014    Procedure: IMPLANT STIMULATOR AND LEADS SACRAL NERVE (STAGE ONE AND TWO);  Surgeon: Austin Newsome MD;  Location: [facility] OR     ORTHOPEDIC SURGERY      carpal tunnel release, dequervains     SOFT TISSUE SURGERY      liposuction       FAMILY HISTORY:  Family History   Problem Relation Age of Onset     Family history unknown: Yes       SOCIAL HISTORY:  Social History     Social History     Marital status:      Spouse name: N/A     Number of children: N/A     Years of education: N/A     Social History Main Topics     Smoking status: Former Smoker     Smokeless tobacco: Never Used     Alcohol use Yes      Comment: very occasionally     Drug use: No     Sexual activity: Not Asked     Other Topics Concern     Special Diet No     Exercise Yes     PT daily for spinal injury      Social History Narrative       Review of Systems:  Skin:  Negative       Eyes:  Negative      ENT:         Respiratory:  Positive for shortness of breath feels with palpitations   Cardiovascular:    Positive for;palpitations;chest pain chest pain with palpitations and dyspnea  Gastroenterology: Negative      Genitourinary:  Positive for urgency \"tiny leakage\" due to nerve damage  Musculoskeletal:  Positive for   shooting pain in right arm, and right leg - d/t spinal " "cord injury, throbbing and numbness in right arm  Neurologic:  Positive for migraine headaches;numbness or tingling of hands migraines have improved; right hand and forearm  Psychiatric:  Positive for sleep disturbances;excessive stress;anxiety;depression insomnia  Heme/Lymph/Imm:  Positive for allergies Topamax   Endocrine:  Negative        Physical Exam:  Vitals: /76 (BP Location: Right arm, Patient Position: Chair, Cuff Size: Adult Regular)  Pulse 84  Ht 1.575 m (5' 2\")  Wt 61.3 kg (135 lb 1.6 oz)  BMI 24.71 kg/m2    Constitutional:  cooperative, alert and oriented, well developed, well nourished, in no acute distress        Skin:  warm and dry to the touch          Head:  normocephalic        Eyes:  pupils equal and round        Lymph:      ENT:  no pallor or cyanosis        Neck:  no carotid bruit        Respiratory:  clear to auscultation;normal symmetry         Cardiac: regular rhythm;no murmurs, gallops or rubs detected                pulses full and equal                                        GI:  abdomen soft;no bruits        Extremities and Muscular Skeletal:  no deformities, clubbing, cyanosis, erythema observed;no edema              Neurological:  no gross motor deficits        Psych:  Alert and Oriented x 3          CC  Kenneth G Pallas, MD  [facility]  [address] [place] [address]                    Thank you for allowing me to participate in the care of your patient.      Sincerely,     Izabella Amaya,      [facility]    cc:   Kenneth G Pallas, MD  [facility]  [address] [place] [address]        "

## 2018-03-22 NOTE — MR AVS SNAPSHOT
"              After Visit Summary   3/22/2018    Kely Preston    MRN: [id number]           Patient Information     Date Of Birth          1975        Visit Information        Provider Department      3/22/2018 2:15 PM Izabella Amaya DO [facility]        Today's Diagnoses     h/o SVT (supraventricular tachycardia) s/p ablation     -  1    Palpitations        Migraine without status migrainosus, not intractable, unspecified migraine type        Adjustment disorder, unspecified type           Follow-ups after your visit        Your next 10 appointments already scheduled     May 08, 2018 11:30 AM CDT   Return Visit with Fredi Day MD   [facility] ([facility])    [address] [place] [address] [phone]              Who to contact     If you have questions or need follow up information about today's clinic visit or your schedule please contact [facility] directly at [phone].  Normal or non-critical lab and imaging results will be communicated to you by Korriohart, letter or phone within 4 business days after the clinic has received the results. If you do not hear from us within 7 days, please contact the clinic through [employer] or phone. If you have a critical or abnormal lab result, we will notify you by phone as soon as possible.  Submit refill requests through Appointedd or call your pharmacy and they will forward the refill request to us. Please allow 3 business days for your refill to be completed.          Additional Information About Your Visit        Korriohart Information     Appointedd lets you send messages to your doctor, view your test results, renew your prescriptions, schedule appointments and more. To sign up, go to [email or web address] . Click on \"Log in\" on the left side of the screen, which will take you to the Welcome page. Then click on " "\"Sign up Now\" on the right side of the page.     You will be asked to enter the access code listed below, as well as some personal information. Please follow the directions to create your username and password.     Your access code is: [email or web address]  Expires: 2018  2:56 PM     Your access code will  in 90 days. If you need help or a new code, please call your [place] clinic or [phone].        Care EveryWhere ID     This is your Care EveryWhere ID. This could be used by other organizations to access your [place] medical records  [id number]        Your Vitals Were     Pulse Height BMI (Body Mass Index)             84 1.575 m (5' 2\") 24.71 kg/m2          Blood Pressure from Last 3 Encounters:   18 116/76   17 119/71   17 108/80    Weight from Last 3 Encounters:   18 61.3 kg (135 lb 1.6 oz)   17 54.8 kg (120 lb 14.4 oz)   17 55.8 kg (123 lb)              Today, you had the following     No orders found for display         Today's Medication Changes          These changes are accurate as of 3/22/18  2:56 PM.  If you have any questions, ask your nurse or doctor.               These medicines have changed or have updated prescriptions.        Dose/Directions    * propranolol 60 MG 24 hr capsule   Commonly known as:  INDERAL LA   This may have changed:  Another medication with the same name was added. Make sure you understand how and when to take each.   Used for:  Palpitations, Migraine without status migrainosus, not intractable, unspecified migraine type, Adjustment disorder, unspecified type   Changed by:  Izabella Amaya DO        Dose:  60 mg   Take 1 capsule (60 mg) by mouth daily   Quantity:  90 capsule   Refills:  4       * propranolol HCl 60 MG Tabs   This may have changed:  You were already taking a medication with the same name, and this prescription was added. Make sure you understand how and when to take each.   Used for:  Palpitations, Migraine " without status migrainosus, not intractable, unspecified migraine type   Changed by:  Izabella Amaya DO        Dose:  60 mg   Take 1 tablet (60 mg) by mouth 2 times daily   Quantity:  180 tablet   Refills:  3       * Notice:  This list has 2 medication(s) that are the same as other medications prescribed for you. Read the directions carefully, and ask your doctor or other care provider to review them with you.         Where to get your medicines      These medications were sent to [facility] [address] [facility]  [address] [facility] [address]     Phone:  [phone]     propranolol HCl 60 MG Tabs                Primary Care Provider Office Phone # Fax #    Kenneth G Pallas, [phone]       [facility] [address] [facility] [address]        Equal Access to Services     [facility] HospitalPRAVEEN : Hadii morteza cheung hadasho Sotaj, waaxda luqadaha, qaybta kaalmada adeegyada, georgie benjamin . So [facility] [phone].    ATENCIÓN: Si habla español, tiene a lakhani disposición servicios gratuitos de asistencia lingüística. Llame al [phone].    We comply with applicable federal civil rights laws and [place] laws. We do not discriminate on the basis of race, color, national origin, age, disability, sex, sexual orientation, or gender identity.            Thank you!     Thank you for choosing [facility]  for your care. Our goal is always to provide you with excellent care. Hearing back from our patients is one way we can continue to improve our services. Please take a few minutes to complete the written survey that you may receive in the mail after your visit with us. Thank you!             Your Updated Medication List - Protect others around you: Learn how to safely use, store and throw away your medicines at www.disposemymeds.org.          This list is  accurate as of 3/22/18  2:56 PM.  Always use your most recent med list.                   Brand Name Dispense Instructions for use Diagnosis    DITROPAN XL 10 MG 24 hr tablet   Generic drug:  oxybutynin      Take 10 mg by mouth 2 times daily        EFFEXOR XR 75 MG 24 hr capsule   Generic drug:  venlafaxine      Take 150 mg by mouth daily        gabapentin 300 MG capsule    NEURONTIN    360 capsule    Take 3 capsules (900 mg) by mouth 4 times daily    Neuropathic pain       indomethacin 50 MG capsule    INDOCIN     Take 50 mg by mouth as needed Take up to 3 tablets per day for headache pain        klonoPIN 0.5 MG tablet   Generic drug:  clonazePAM      Take 0.5 mg by mouth 2 times daily as needed        oxyCODONE-acetaminophen  MG per tablet    PERCOCET     Take 1 tablet by mouth every 6 hours as needed for moderate to severe pain        * propranolol 60 MG 24 hr capsule    INDERAL LA    90 capsule    Take 1 capsule (60 mg) by mouth daily    Palpitations, Migraine without status migrainosus, not intractable, unspecified migraine type, Adjustment disorder, unspecified type       * propranolol HCl 60 MG Tabs     180 tablet    Take 1 tablet (60 mg) by mouth 2 times daily    Palpitations, Migraine without status migrainosus, not intractable, unspecified migraine type       tiZANidine 4 MG tablet    ZANAFLEX    60 tablet    Take 1 tablet (4 mg) by mouth 2 times daily as needed    Hand pain       traZODone 150 MG tablet    DESYREL     Take  mg by mouth At Bedtime        * Notice:  This list has 2 medication(s) that are the same as other medications prescribed for you. Read the directions carefully, and ask your doctor or other care provider to review them with you.

## 2018-03-22 NOTE — PROGRESS NOTES
HPI and Plan:   See dictation    No orders of the defined types were placed in this encounter.      Orders Placed This Encounter   Medications     propranolol HCl 60 MG TABS     Sig: Take 1 tablet (60 mg) by mouth 2 times daily     Dispense:  180 tablet     Refill:  3       Medications Discontinued During This Encounter   Medication Reason     OXYBUTYNIN TD Medication Reconciliation Clean Up         Encounter Diagnoses   Name Primary?     Palpitations      Migraine without status migrainosus, not intractable, unspecified migraine type      Adjustment disorder, unspecified type      h/o SVT (supraventricular tachycardia) s/p ablation  Yes       CURRENT MEDICATIONS:  Current Outpatient Prescriptions   Medication Sig Dispense Refill     propranolol HCl 60 MG TABS Take 1 tablet (60 mg) by mouth 2 times daily 180 tablet 3     gabapentin (NEURONTIN) 300 MG capsule Take 3 capsules (900 mg) by mouth 4 times daily 360 capsule 5     propranolol (INDERAL LA) 60 MG 24 hr capsule Take 1 capsule (60 mg) by mouth daily 90 capsule 4     venlafaxine (EFFEXOR XR) 75 MG 24 hr capsule Take 150 mg by mouth daily        oxyCODONE-acetaminophen (PERCOCET)  MG per tablet Take 1 tablet by mouth every 6 hours as needed for moderate to severe pain       oxybutynin (DITROPAN XL) 10 MG 24 hr tablet Take 10 mg by mouth 2 times daily        traZODone (DESYREL) 150 MG tablet Take  mg by mouth At Bedtime        tiZANidine (ZANAFLEX) 4 MG tablet Take 1 tablet (4 mg) by mouth 2 times daily as needed 60 tablet 1     clonazePAM (KLONOPIN) 0.5 MG tablet Take 0.5 mg by mouth 2 times daily as needed        indomethacin (INDOCIN) 50 MG capsule Take 50 mg by mouth as needed Take up to 3 tablets per day for headache pain         ALLERGIES     Allergies   Allergen Reactions     Topamax [Topiramate]      emma       PAST MEDICAL HISTORY:  Past Medical History:   Diagnosis Date     Anxiety      Chronic pain syndrome      Depressive disorder       "Irregular heart beat      Migraine      Myelomalacia (H)      Neuropathy      Other chronic pain      Unspecified site of spinal cord injury without evidence of spinal bone injury        PAST SURGICAL HISTORY:  Past Surgical History:   Procedure Laterality Date     CARDIAC SURGERY      ablation for arrhythmia     GYN SURGERY      c section x2     IMPLANT STIMULATOR AND LEADS SACRAL NERVE (STAGE ONE AND TWO)  7/30/2014    Procedure: IMPLANT STIMULATOR AND LEADS SACRAL NERVE (STAGE ONE AND TWO);  Surgeon: Austin Newsome MD;  Location: [facility]     ORTHOPEDIC SURGERY      carpal tunnel release, dequervains     SOFT TISSUE SURGERY      liposuction       FAMILY HISTORY:  Family History   Problem Relation Age of Onset     Family history unknown: Yes       SOCIAL HISTORY:  Social History     Social History     Marital status:      Spouse name: N/A     Number of children: N/A     Years of education: N/A     Social History Main Topics     Smoking status: Former Smoker     Smokeless tobacco: Never Used     Alcohol use Yes      Comment: very occasionally     Drug use: No     Sexual activity: Not Asked     Other Topics Concern     Special Diet No     Exercise Yes     PT daily for spinal injury      Social History Narrative       Review of Systems:  Skin:  Negative       Eyes:  Negative      ENT:         Respiratory:  Positive for shortness of breath feels with palpitations   Cardiovascular:    Positive for;palpitations;chest pain chest pain with palpitations and dyspnea  Gastroenterology: Negative      Genitourinary:  Positive for urgency \"tiny leakage\" due to nerve damage  Musculoskeletal:  Positive for   shooting pain in right arm, and right leg - d/t spinal cord injury, throbbing and numbness in right arm  Neurologic:  Positive for migraine headaches;numbness or tingling of hands migraines have improved; right hand and forearm  Psychiatric:  Positive for sleep disturbances;excessive stress;anxiety;depression " "insomnia  Heme/Lymph/Imm:  Positive for allergies Topamax   Endocrine:  Negative        Physical Exam:  Vitals: /76 (BP Location: Right arm, Patient Position: Chair, Cuff Size: Adult Regular)  Pulse 84  Ht 1.575 m (5' 2\")  Wt 61.3 kg (135 lb 1.6 oz)  BMI 24.71 kg/m2    Constitutional:  cooperative, alert and oriented, well developed, well nourished, in no acute distress        Skin:  warm and dry to the touch          Head:  normocephalic        Eyes:  pupils equal and round        Lymph:      ENT:  no pallor or cyanosis        Neck:  no carotid bruit        Respiratory:  clear to auscultation;normal symmetry         Cardiac: regular rhythm;no murmurs, gallops or rubs detected                pulses full and equal                                        GI:  abdomen soft;no bruits        Extremities and Muscular Skeletal:  no deformities, clubbing, cyanosis, erythema observed;no edema              Neurological:  no gross motor deficits        Psych:  Alert and Oriented x 3          CC  Kenneth G Pallas, MD  [facility]  [address] [place] [address]                  "

## 2018-03-22 NOTE — LETTER
3/22/2018      Kenneth G. Pallas, MD  [facility] [address]  [facility] [address]      RE: Kely Preston       Dear Colleague,    I had the pleasure of seeing Kely Preston in the [facility].    Service Date: 03/22/2018      HISTORY OF PRESENT ILLNESS:  Ms. Preston is a pleasant 42-year-old female with a history of SVT, prior ablation in 1993 and palpitations.  She also has an underlying history of migraine headaches.  Last year we had placed her on Inderal for her palpitations and also as preventative for migraines.  This has worked well for her, but she is returning to clinic today stating that her palpitations have recurred.  She states she thinks the Inderal is still helping, but she is having episodes where she can feel her heart skipping or pausing and then also the racing symptoms again with fast heart rhythms.  She does have some chest discomfort when her heart rate is racing or fast.  She notices it more at nighttime.  It does not seem to be related to exertion or stressful situations.  She is continuing to take Inderal.  She is on Effexor, but has been on this for decades without change in dosage.  No new medications recently.  Her last thyroid test looks like was from 2012 and was normal.      PHYSICAL EXAMINATION:  Today her blood pressure is 116/76, pulse is 84, weight 135 with a body mass index of 24.  She has regular rhythm today.  I do not appreciate a murmur, gallop or rub.  Lungs are clear and she has no peripheral edema.      In summary, Ms. Preston is a pleasant 42-year-old female with a history of SVT and SVT ablation in 1993, benign PACs and PVCs with an increase in palpitations, both skipped heartbeats as well as racing heart sensation.  I have recommended a trial of an increase of Inderal since this was effective initially in treating her symptoms.  I cautioned her that she may have a side effect of low blood pressures and  feels fatigued and/or lightheaded with this.  I would recommend that we increase the frequency rather than the dose to hopefully avoid the side effects.  I am going to try her on 60 mg of Inderal twice daily instead of once daily and I have asked her to contact me if this is ineffective or causes side effect.  I will likely next have her see Electrophysiology for consideration of another EP study and possible ablation procedure.        Please feel free to contact me with any questions you have in regards to her care.      cc:      Kenneth Pallas, MD    [facility]   [address] [place] [address]         MIRIAN GARRISON DO             D: 2018   T: 2018   MT: DEBBIE      Name:     ALEXANDRE HUGGINS   MRN:      [id number]        Account:      [id number]   :      1975           Service Date: 2018      Document: [id number]         Outpatient Encounter Prescriptions as of 3/22/2018   Medication Sig Dispense Refill     propranolol HCl 60 MG TABS Take 1 tablet (60 mg) by mouth 2 times daily 180 tablet 3     gabapentin (NEURONTIN) 300 MG capsule Take 3 capsules (900 mg) by mouth 4 times daily 360 capsule 5     propranolol (INDERAL LA) 60 MG 24 hr capsule Take 1 capsule (60 mg) by mouth daily 90 capsule 4     venlafaxine (EFFEXOR XR) 75 MG 24 hr capsule Take 150 mg by mouth daily        oxyCODONE-acetaminophen (PERCOCET)  MG per tablet Take 1 tablet by mouth every 6 hours as needed for moderate to severe pain       oxybutynin (DITROPAN XL) 10 MG 24 hr tablet Take 10 mg by mouth 2 times daily        traZODone (DESYREL) 150 MG tablet Take  mg by mouth At Bedtime        tiZANidine (ZANAFLEX) 4 MG tablet Take 1 tablet (4 mg) by mouth 2 times daily as needed 60 tablet 1     clonazePAM (KLONOPIN) 0.5 MG tablet Take 0.5 mg by mouth 2 times daily as needed        indomethacin (INDOCIN) 50 MG capsule Take 50 mg by mouth as needed Take up to 3 tablets per day for  headache pain       [DISCONTINUED] OXYBUTYNIN TD Take 5 mg by mouth At Bedtime 5 mg at bedtime as needed.       No facility-administered encounter medications on file as of 3/22/2018.        Again, thank you for allowing me to participate in the care of your patient.      Sincerely,    Izabella Amaya DO     [facility]

## 2018-03-22 NOTE — PROGRESS NOTES
Service Date: 03/22/2018      HISTORY OF PRESENT ILLNESS:  Ms. Preston is a pleasant 42-year-old female with a history of SVT, prior ablation in 1993 and palpitations.  She also has an underlying history of migraine headaches.  Last year we had placed her on Inderal for her palpitations and also as preventative for migraines.  This has worked well for her, but she is returning to clinic today stating that her palpitations have recurred.  She states she thinks the Inderal is still helping, but she is having episodes where she can feel her heart skipping or pausing and then also the racing symptoms again with fast heart rhythms.  She does have some chest discomfort when her heart rate is racing or fast.  She notices it more at nighttime.  It does not seem to be related to exertion or stressful situations.  She is continuing to take Inderal.  She is on Effexor, but has been on this for decades without change in dosage.  No new medications recently.  Her last thyroid test looks like was from 2012 and was normal.      PHYSICAL EXAMINATION:  Today her blood pressure is 116/76, pulse is 84, weight 135 with a body mass index of 24.  She has regular rhythm today.  I do not appreciate a murmur, gallop or rub.  Lungs are clear and she has no peripheral edema.      In summary, Ms. Preston is a pleasant 42-year-old female with a history of SVT and SVT ablation in 1993, benign PACs and PVCs with an increase in palpitations, both skipped heartbeats as well as racing heart sensation.  I have recommended a trial of an increase of Inderal since this was effective initially in treating her symptoms.  I cautioned her that she may have a side effect of low blood pressures and feels fatigued and/or lightheaded with this.  I would recommend that we increase the frequency rather than the dose to hopefully avoid the side effects.  I am going to try her on 60 mg of Inderal twice daily instead of once daily and I have asked her to contact me  if this is ineffective or causes side effect.  I will likely next have her see Electrophysiology for consideration of another EP study and possible ablation procedure.        Please feel free to contact me with any questions you have in regards to her care.      cc:      Kenneth Pallas, MD    [facility]   [address] [place] [address]         MIRIAN GARRISON DO             D: 2018   T: 2018   MT: DEBBIE      Name:     ALEXANDRE HUGGINS   MRN:      [id number]        Account:      [id number]   :      1975           Service Date: 2018      Document: [id number]

## 2018-03-30 DIAGNOSIS — M79.2 NEUROPATHIC PAIN: ICD-10-CM

## 2018-03-30 RX ORDER — GABAPENTIN 300 MG/1
900 CAPSULE ORAL 4 TIMES DAILY
Qty: 360 CAPSULE | Refills: 0 | Status: SHIPPED | OUTPATIENT
Start: 2018-03-30 | End: 2018-06-01

## 2018-03-30 NOTE — TELEPHONE ENCOUNTER
Writer received a refill request from: Melissa Monterroso    Signed Prescriptions:                        Disp   Refills    gabapentin (NEURONTIN) 300 MG capsule      360 ca*0        Sig: Take 3 capsules (900 mg) by mouth 4 times daily  Authorizing Provider: GIA ABARCA  Ordering User: SEVERIN-BROWN, DARLA    Pt's last office visit: 5/23/17  Next scheduled office visit: 5/8/18        Per the RN/LPN medication refill protocol, writer is able to refill this request. If able to refill, please call the pt to inform them the RX was sent to the pharmacy. If unable to refill, route this encounter to the prescribing physician for authorization or further instructions.    Stephany Varghese LPN

## 2018-04-30 ENCOUNTER — TRANSFERRED RECORDS (OUTPATIENT)
Dept: HEALTH INFORMATION MANAGEMENT | Facility: CLINIC | Age: 43
End: 2018-04-30

## 2018-05-08 ENCOUNTER — OFFICE VISIT (OUTPATIENT)
Dept: NEUROLOGY | Facility: CLINIC | Age: 43
End: 2018-05-08
Payer: COMMERCIAL

## 2018-05-08 VITALS
OXYGEN SATURATION: 99 % | WEIGHT: 136 LBS | BODY MASS INDEX: 24.87 KG/M2 | HEART RATE: 62 BPM | SYSTOLIC BLOOD PRESSURE: 107 MMHG | DIASTOLIC BLOOD PRESSURE: 70 MMHG

## 2018-05-08 DIAGNOSIS — M79.2 NEUROPATHIC PAIN: Primary | ICD-10-CM

## 2018-05-08 PROCEDURE — 99213 OFFICE O/P EST LOW 20 MIN: CPT | Performed by: PSYCHIATRY & NEUROLOGY

## 2018-05-08 NOTE — PROGRESS NOTES
May 8, 2018      Kenneth G Pallas MD   [facility]    [address] [place] [address]      Dear Doctors:      I saw Kely Preston (Missy) in followup at the [facility] in [place] today.  As you know, I had seen her here previously for assistance in a neuropathic pain syndrome.  This is currently being managed principally by Dr. Corbin but Rema returns for routine followup.  She is also receiving gabapentin at my request.      Rema reports that her condition is acceptably stable.  She is obtaining some relief with interventions initiated at [facility] and is considering spinal cord stimulation.  She has not noticed a substantial change in her negative sensory symptoms since she was seen last.      EXAMINATION:  The patient is alert and cooperative.  Perception of light touch is within broad normal limits throughout.  Vibration scores are 4 at the right elbow, 0 at the right ulnar styloid, and 0 at the right index finger.  Perception of vibration is preserved at the left index finger.  It is absent at the right great toe.  Vibration scores are 4 at the right malleolus and the left great toe.  Reflexes are preserved.  Pinprick perception is broadly reduced in the right upper limb and preserved elsewhere.  Rapid repetitive movements are normal in the feet and gait is normal.      Rema's examination demonstrates no change since she was seen last.  There is a reduction in vibration perception at the right great toe and she does report a subjective reduction in sensation over the dorsum of the right foot, which is likely stable.  There may be a mononeuropathy such as a superficial radial nerve injury or entrapment, but it is unlikely that such a focal symptom would be related to her spinal cord injury.  Rema is welcome to return annually and as-needed.  I have refilled her gabapentin and have requested results of her creatinine,  which was likely drawn at her primary care office earlier this year.         Sincerely,      GIA ABARCA MD             D: 2018   T: 2018   MT: LINETTE      Name:     ALEXANDRE HUGGINS   MRN:      [id number]        Account:      [id number]   :      1975      Document: [id number]       cc: Andrew Will MD Constance Dankle DO Jessica Olson Heiring MD Kenneth Pallas MD

## 2018-05-08 NOTE — MR AVS SNAPSHOT
After Visit Summary   2018    Kely Preston    MRN: [id number]           Patient Information     Date Of Birth          1975        Visit Information        Provider Department      2018 11:30 AM Fredi Day MD [facility]         Follow-ups after your visit        Who to contact     If you have questions or need follow up information about today's clinic visit or your schedule please contact [facility] directly at [phone].  Normal or non-critical lab and imaging results will be communicated to you by MyChart, letter or phone within 4 business days after the clinic has received the results. If you do not hear from us within 7 days, please contact the clinic through [employer] or phone. If you have a critical or abnormal lab result, we will notify you by phone as soon as possible.  Submit refill requests through aaTag or call your pharmacy and they will forward the refill request to us. Please allow 3 business days for your refill to be completed.          Additional Information About Your Visit        MyChart Information     aaTag is an electronic gateway that provides easy, online access to your medical records. With aaTag, you can request a clinic appointment, read your test results, renew a prescription or communicate with your care team.     To sign up for aaTag visit the website at [email or web address]   You will be asked to enter the access code listed below, as well as some personal information. Please follow the directions to create your username and password.     Your access code is: [email or web address]  Expires: 2018  2:56 PM     Your access code will  in 90 days. If you need help or a new code, please contact your [facility] or call [phone] for assistance.        Care EveryWhere ID     This is your Care EveryWhere ID. This could be used by other organizations to access your [facility]  medical records  [id number]        Your Vitals Were     Pulse Pulse Oximetry BMI (Body Mass Index)             62 99% 24.87 kg/m2          Blood Pressure from Last 3 Encounters:   05/08/18 107/70   03/22/18 116/76   05/23/17 119/71    Weight from Last 3 Encounters:   05/08/18 61.7 kg (136 lb)   03/22/18 61.3 kg (135 lb 1.6 oz)   05/23/17 54.8 kg (120 lb 14.4 oz)              Today, you had the following     No orders found for display       Primary Care Provider Office Phone # Fax #    Kenneth G Pallas, [phone]       [facility] [address] [facility] [address]        Equal Access to Services     EDWIN LEMUS : Hadii aad ku hadasho Soomaali, waaxda luqadaha, qaybta kaalmada adeegyada, georgie stevenson hayrachel benjamin . So [facility] [phone].    ATENCIÓN: Si habla español, tiene a lakhani disposición servicios gratuitos de asistencia lingüística. Llame al [phone].    We comply with applicable federal civil rights laws and [place] laws. We do not discriminate on the basis of race, color, national origin, age, disability, sex, sexual orientation, or gender identity.            Thank you!     Thank you for choosing [facility]  for your care. Our goal is always to provide you with excellent care. Hearing back from our patients is one way we can continue to improve our services. Please take a few minutes to complete the written survey that you may receive in the mail after your visit with us. Thank you!             Your Updated Medication List - Protect others around you: Learn how to safely use, store and throw away your medicines at www.disposemymeds.org.          This list is accurate as of 5/8/18 12:28 PM.  Always use your most recent med list.                   Brand Name Dispense Instructions for use Diagnosis    DITROPAN XL 10 MG 24 hr tablet   Generic drug:  oxybutynin      Take 10 mg by mouth 2 times daily        EFFEXOR XR 75 MG 24 hr  capsule   Generic drug:  venlafaxine      Take 150 mg by mouth daily        gabapentin 300 MG capsule    NEURONTIN    360 capsule    Take 3 capsules (900 mg) by mouth 4 times daily    Neuropathic pain       indomethacin 50 MG capsule    INDOCIN     Take 50 mg by mouth as needed Take up to 3 tablets per day for headache pain        klonoPIN 0.5 MG tablet   Generic drug:  clonazePAM      Take 0.5 mg by mouth 2 times daily as needed        oxyCODONE-acetaminophen  MG per tablet    PERCOCET     Take 1 tablet by mouth every 6 hours as needed for moderate to severe pain        * propranolol 60 MG 24 hr capsule    INDERAL LA    90 capsule    Take 1 capsule (60 mg) by mouth daily    Palpitations, Migraine without status migrainosus, not intractable, unspecified migraine type, Adjustment disorder, unspecified type       * propranolol HCl 60 MG Tabs     180 tablet    Take 1 tablet (60 mg) by mouth 2 times daily    Palpitations, Migraine without status migrainosus, not intractable, unspecified migraine type       tiZANidine 4 MG tablet    ZANAFLEX    60 tablet    Take 1 tablet (4 mg) by mouth 2 times daily as needed    Hand pain       traZODone 150 MG tablet    DESYREL     Take  mg by mouth At Bedtime        * Notice:  This list has 2 medication(s) that are the same as other medications prescribed for you. Read the directions carefully, and ask your doctor or other care provider to review them with you.

## 2018-05-08 NOTE — LETTER
5/8/2018         RE: Kely Preston  [address] [facility] [address]        Dear Colleague,    Thank you for referring your patient, Kely Preston, to the [facility]. Please see a copy of my visit note below.    [id number]    May 8, 2018      Kenneth G Pallas MD   [facility]    [address] [place] [address]      Dear Doctors:      I saw Kely Preston (Missy) in followup at the [facility] in [place] today.  As you know, I had seen her here previously for assistance in a neuropathic pain syndrome.  This is currently being managed principally by Dr. Corbin but Rema returns for routine followup.  She is also receiving gabapentin at my request.      Rema reports that her condition is acceptably stable.  She is obtaining some relief with interventions initiated at [facility] and is considering spinal cord stimulation.  She has not noticed a substantial change in her negative sensory symptoms since she was seen last.      EXAMINATION:  The patient is alert and cooperative.  Perception of light touch is within broad normal limits throughout.  Vibration scores are 4 at the right elbow, 0 at the right ulnar styloid, and 0 at the right index finger.  Perception of vibration is preserved at the left index finger.  It is absent at the right great toe.  Vibration scores are 4 at the right malleolus and the left great toe.  Reflexes are preserved.  Pinprick perception is broadly reduced in the right upper limb and preserved elsewhere.  Rapid repetitive movements are normal in the feet and gait is normal.      Rema's examination demonstrates no change since she was seen last.  There is a reduction in vibration perception at the right great toe and she does report a subjective reduction in sensation over the dorsum of the right foot, which is likely stable.  There may be a mononeuropathy such as a superficial  radial nerve injury or entrapment, but it is unlikely that such a focal symptom would be related to her spinal cord injury.  Rema is welcome to return annually and as-needed.  I have refilled her gabapentin and have requested results of her creatinine, which was likely drawn at her primary care office earlier this year.         Sincerely,      GIA ABARCA MD             D: 2018   T: 2018   MT: LINETTE      Name:     ALEXANDRE HUGGINS   MRN:      [id number]        Account:      [id number]   :      1975      Document: [id number]       cc: Andrew Will MD Constance Dankle DO Jessica Olson Heiring MD Kenneth Pallas MD       Again, thank you for allowing me to participate in the care of your patient.        Sincerely,        Gia Abarca MD

## 2018-05-08 NOTE — PATIENT INSTRUCTIONS
Thank you for choosing [facility] in [place]. It was a pleasure to see you for your office visit today.     The following is a summary of your office visit:    We will see you back at your yearly follow up appointment.   Please discuss Ketamine with Dr. Pallas next time you are at the pain clinic .    Nurse/clinic contact information: [facility] [phone]    Further instructions for your care: Call if your symptoms change or worsen. Stop at the lab on your way out today, we will be in touch with you regarding the results.

## 2018-06-01 DIAGNOSIS — M79.2 NEUROPATHIC PAIN: ICD-10-CM

## 2018-06-04 DIAGNOSIS — M79.2 NEUROPATHIC PAIN: ICD-10-CM

## 2018-06-04 RX ORDER — GABAPENTIN 300 MG/1
CAPSULE ORAL
Qty: 360 CAPSULE | Refills: 3 | Status: SHIPPED | OUTPATIENT
Start: 2018-06-04 | End: 2020-05-05

## 2018-06-04 RX ORDER — GABAPENTIN 300 MG/1
CAPSULE ORAL
Qty: 360 CAPSULE | Refills: 0 | Status: SHIPPED | OUTPATIENT
Start: 2018-06-04 | End: 2018-06-04

## 2018-06-04 NOTE — TELEPHONE ENCOUNTER
Obtaining creatinine results from [facility]. Once received, we will forward this refill request on to Dr Day to sign.  Kely Hopkins RN

## 2018-06-04 NOTE — TELEPHONE ENCOUNTER
----- Message from Monique Mora CMA sent at 6/4/2018 10:21 AM CDT -----  Regarding: RE: Lab Results   I just called [facility] and pt has not had any labs since 2016 aside from a rapid strep test in February.     Monique Mora CMA    ----- Message -----     From: Kely Hopkins RN     Sent: 6/4/2018   9:54 AM       To: Monique Mora CMA  Subject: Lab Results                                      Per Dr Day and the pt, she had a creatinine drawn earlier this year at her PCP's office ([facility]). Could you get them to fax us these results so we can get her Gabapentin refilled?     Thanks!  Kely

## 2018-06-04 NOTE — TELEPHONE ENCOUNTER
Patient called requesting a yearly refill be sent to the [facility] for gabapentin. Patient stated Dr Day has done this in the past for her. If you have any questions, please call patient. Thank you

## 2018-06-04 NOTE — TELEPHONE ENCOUNTER
Dr Day, see Monique's message below. Gabapentin RX pending in  along with a creatinine lab order in case you wanted the pt to have this drawn.  Kely Hopkins RN

## 2018-10-22 ENCOUNTER — TELEPHONE (OUTPATIENT)
Dept: NEUROLOGY | Facility: CLINIC | Age: 43
End: 2018-10-22

## 2018-10-22 NOTE — TELEPHONE ENCOUNTER
Patient returned my phone call. She will have the lab drawn at the [facility], the order was faxed to [phone], clinic contact number [phone]. Patient will have the lab drawn, she recently had surgery.    Marj STACY [facility]~Specialty/Med Surg   [phone]

## 2018-10-22 NOTE — TELEPHONE ENCOUNTER
1st attempt to reach the patient and schedule a creatinine lab per the request of Dr. Day due to the patient taking Gabapentin; lvm.    Marj [facility]~Specialty/Med Surg   [phone]

## 2018-11-09 ENCOUNTER — TRANSFERRED RECORDS (OUTPATIENT)
Dept: HEALTH INFORMATION MANAGEMENT | Facility: CLINIC | Age: 43
End: 2018-11-09

## 2018-11-09 LAB — CREAT SERPL-MCNC: 0.79 MG/DL (ref 0.6–1.3)

## 2019-03-15 DIAGNOSIS — G43.909 MIGRAINE WITHOUT STATUS MIGRAINOSUS, NOT INTRACTABLE, UNSPECIFIED MIGRAINE TYPE: ICD-10-CM

## 2019-03-15 DIAGNOSIS — F43.20 ADJUSTMENT DISORDER, UNSPECIFIED TYPE: ICD-10-CM

## 2019-03-15 DIAGNOSIS — R00.2 PALPITATIONS: ICD-10-CM

## 2019-03-15 RX ORDER — PROPRANOLOL HCL 60 MG
CAPSULE, EXTENDED RELEASE 24HR ORAL
Qty: 60 CAPSULE | Refills: 1 | Status: SHIPPED | OUTPATIENT
Start: 2019-03-15 | End: 2024-04-30

## 2019-05-07 ENCOUNTER — OFFICE VISIT (OUTPATIENT)
Dept: NEUROLOGY | Facility: CLINIC | Age: 44
End: 2019-05-07
Payer: COMMERCIAL

## 2019-05-07 VITALS
HEIGHT: 62 IN | OXYGEN SATURATION: 99 % | SYSTOLIC BLOOD PRESSURE: 97 MMHG | BODY MASS INDEX: 22.08 KG/M2 | HEART RATE: 92 BPM | WEIGHT: 120 LBS | DIASTOLIC BLOOD PRESSURE: 68 MMHG

## 2019-05-07 DIAGNOSIS — M79.2 NEUROPATHIC PAIN: Primary | ICD-10-CM

## 2019-05-07 PROCEDURE — 99213 OFFICE O/P EST LOW 20 MIN: CPT | Performed by: PSYCHIATRY & NEUROLOGY

## 2019-05-07 RX ORDER — BUPROPION HYDROCHLORIDE 150 MG/1
300 TABLET ORAL DAILY
COMMUNITY
Start: 2019-03-11

## 2019-05-07 ASSESSMENT — PAIN SCALES - GENERAL: PAINLEVEL: SEVERE PAIN (6)

## 2019-05-07 ASSESSMENT — MIFFLIN-ST. JEOR: SCORE: 1147.57

## 2019-05-07 NOTE — LETTER
5/7/2019         RE: Kely Preston  [id number] [address] [address]        Dear Colleague,    Thank you for referring your patient, Kely Preston, to the [facility]. Please see a copy of my visit note below.    Return visit for 43 year old woman with central neuropathic pain. She has had significant improvement in pain with spinal cord stimulator and would like to reduce gabapentin dose. No new neurologic symptoms.     Mental state: Alert, appropriate, speech, language, and thought content normal.     Cranial nerves II-XII normal.    Sensory examination:   Right Left   Light touch Reduced right hand and forearm Normal   Vibration (timed)     Vibration (Rydell-Seiffer) MM5, marginal at right elbow 5   Pin Reduced right hand and forearm Normal   Position     Light touch and pin qualitatively reduced right foot dorsum    Legend:   MM = medial malleolus, TT = tibial tuberosity, K = patella, MCP = MCP joint  MF = mid-foot, DC = distal calf, MC = mid calf, PC = proximal calf      Manual muscle testing (A indicates atrophy):   Right Left   Shoulder abduction  5 5   Elbow extension 5 5   Elbow flexion 5 5   Wrist extension  5 5   Finger extension 5 5   FDI 5 5   APB 5 5   Hip flexion 5 5   Knee flexion 5 5   Knee extension 5 5   Dorsiflexion 5 5   Plantar flexion 5 5     Muscle tone:   Right Left   Upper limb Normal Normal   Lower limb Normal Normal        Reflexes:   Right Left   Triceps 2 2   Biceps 2 2   Brachioradialis 2 2   Mynor 2 2   Patellar 2 2   Achilles 2 2   Plantar Flexor Flexor   Clonus Absent Absent        Gait:  Normal.    Impression:  Stable examination.    Recommendations:  Reduce gabapentin dosing per patient request.   RTC  1 year.        Again, thank you for allowing me to participate in the care of your patient.        Sincerely,        Fredi Day MD

## 2019-05-07 NOTE — PROGRESS NOTES
Return visit for 43 year old woman with central neuropathic pain. She has had significant improvement in pain with spinal cord stimulator and would like to reduce gabapentin dose. No new neurologic symptoms.     Mental state: Alert, appropriate, speech, language, and thought content normal.     Cranial nerves II-XII normal.    Sensory examination:   Right Left   Light touch Reduced right hand and forearm Normal   Vibration (timed)     Vibration (Rydell-Seiffer) MM5, marginal at right elbow 5   Pin Reduced right hand and forearm Normal   Position     Light touch and pin qualitatively reduced right foot dorsum    Legend:   MM = medial malleolus, TT = tibial tuberosity, K = patella, MCP = MCP joint  MF = mid-foot, DC = distal calf, MC = mid calf, PC = proximal calf      Manual muscle testing (A indicates atrophy):   Right Left   Shoulder abduction  5 5   Elbow extension 5 5   Elbow flexion 5 5   Wrist extension  5 5   Finger extension 5 5   FDI 5 5   APB 5 5   Hip flexion 5 5   Knee flexion 5 5   Knee extension 5 5   Dorsiflexion 5 5   Plantar flexion 5 5     Muscle tone:   Right Left   Upper limb Normal Normal   Lower limb Normal Normal        Reflexes:   Right Left   Triceps 2 2   Biceps 2 2   Brachioradialis 2 2   Mynor 2 2   Patellar 2 2   Achilles 2 2   Plantar Flexor Flexor   Clonus Absent Absent        Gait:  Normal.    Impression:  Stable examination.    Recommendations:  Reduce gabapentin dosing per patient request.   RTC  1 year.

## 2019-05-07 NOTE — PATIENT INSTRUCTIONS
1. Reduce your gabapentin to 3 capsules three times a day. Call if you need to go back up on your dose.  2. Return in 1 year.   3. Repeat creatinine in November - I will write the order.  4. Discuss medical cannabis with your pain management specialist if you are interested.  5. Examination is stable.

## 2019-05-07 NOTE — NURSING NOTE
"Kely ALARCON Kary's goals for this visit include: return  She requests these members of her care team be copied on today's visit information:     PCP: Pallas, Kenneth G    Referring Provider:  No referring provider defined for this encounter.    BP 97/68   Pulse 92   Ht 1.575 m (5' 2\")   Wt 54.4 kg (120 lb)   SpO2 99%   BMI 21.95 kg/m      Do you need any medication refills at today's visit? y  "

## 2019-10-29 NOTE — OR NURSING
Pt has a [employer] spinal cord stimulator.  Spoke with Oliver at [facility].  If cautery is used, surgery mode should be enabled prior to surgery.  Monopolar cautery is not advised.  Bipolar cautery is preferred, cautery should stay 6-8 inches away from device system and lowest power used.  [employer] [facility] phone number: [phone].

## 2019-10-30 ENCOUNTER — HOSPITAL ENCOUNTER (OUTPATIENT)
Facility: CLINIC | Age: 44
Discharge: HOME | End: 2019-10-30
Attending: UROLOGY | Admitting: UROLOGY
Payer: COMMERCIAL

## 2019-10-30 ENCOUNTER — ANESTHESIA EVENT (OUTPATIENT)
Dept: SURGERY | Facility: CLINIC | Age: 44
End: 2019-10-30
Payer: COMMERCIAL

## 2019-10-30 ENCOUNTER — APPOINTMENT (OUTPATIENT)
Dept: GENERAL RADIOLOGY | Facility: CLINIC | Age: 44
End: 2019-10-30
Attending: UROLOGY
Payer: COMMERCIAL

## 2019-10-30 ENCOUNTER — ANESTHESIA (OUTPATIENT)
Dept: SURGERY | Facility: CLINIC | Age: 44
End: 2019-10-30
Payer: COMMERCIAL

## 2019-10-30 VITALS
BODY MASS INDEX: 21.66 KG/M2 | OXYGEN SATURATION: 98 % | HEIGHT: 62 IN | DIASTOLIC BLOOD PRESSURE: 99 MMHG | RESPIRATION RATE: 16 BRPM | TEMPERATURE: 97.5 F | WEIGHT: 117.7 LBS | SYSTOLIC BLOOD PRESSURE: 119 MMHG | HEART RATE: 59 BPM

## 2019-10-30 DIAGNOSIS — N32.81 OAB (OVERACTIVE BLADDER): Primary | ICD-10-CM

## 2019-10-30 LAB — HCG UR QL: NEGATIVE

## 2019-10-30 PROCEDURE — 25000128 H RX IP 250 OP 636: Performed by: ANESTHESIOLOGY

## 2019-10-30 PROCEDURE — 37000008 ZZH ANESTHESIA TECHNICAL FEE, 1ST 30 MIN: Performed by: UROLOGY

## 2019-10-30 PROCEDURE — 71000027 ZZH RECOVERY PHASE 2 EACH 15 MINS: Performed by: UROLOGY

## 2019-10-30 PROCEDURE — 37000009 ZZH ANESTHESIA TECHNICAL FEE, EACH ADDTL 15 MIN: Performed by: UROLOGY

## 2019-10-30 PROCEDURE — 25000128 H RX IP 250 OP 636: Performed by: UROLOGY

## 2019-10-30 PROCEDURE — 25000128 H RX IP 250 OP 636: Performed by: NURSE ANESTHETIST, CERTIFIED REGISTERED

## 2019-10-30 PROCEDURE — 25800030 ZZH RX IP 258 OP 636: Performed by: ANESTHESIOLOGY

## 2019-10-30 PROCEDURE — 25800030 ZZH RX IP 258 OP 636: Performed by: NURSE ANESTHETIST, CERTIFIED REGISTERED

## 2019-10-30 PROCEDURE — 25000125 ZZHC RX 250: Performed by: UROLOGY

## 2019-10-30 PROCEDURE — C1883 ADAPT/EXT, PACING/NEURO LEAD: HCPCS | Performed by: UROLOGY

## 2019-10-30 PROCEDURE — 40000277 XR SURGERY CARM FLUORO LESS THAN 5 MIN W STILLS

## 2019-10-30 PROCEDURE — 27210794 ZZH OR GENERAL SUPPLY STERILE: Performed by: UROLOGY

## 2019-10-30 PROCEDURE — C1767 GENERATOR, NEURO NON-RECHARG: HCPCS | Performed by: UROLOGY

## 2019-10-30 PROCEDURE — 25000132 ZZH RX MED GY IP 250 OP 250 PS 637: Performed by: ANESTHESIOLOGY

## 2019-10-30 PROCEDURE — C1778 LEAD, NEUROSTIMULATOR: HCPCS | Performed by: UROLOGY

## 2019-10-30 PROCEDURE — C1787 PATIENT PROGR, NEUROSTIM: HCPCS | Performed by: UROLOGY

## 2019-10-30 PROCEDURE — 36000058 ZZH SURGERY LEVEL 3 EA 15 ADDTL MIN: Performed by: UROLOGY

## 2019-10-30 PROCEDURE — 40000170 ZZH STATISTIC PRE-PROCEDURE ASSESSMENT II: Performed by: UROLOGY

## 2019-10-30 PROCEDURE — 25000132 ZZH RX MED GY IP 250 OP 250 PS 637: Performed by: UROLOGY

## 2019-10-30 PROCEDURE — 36000060 ZZH SURGERY LEVEL 3 W FLUORO 1ST 30 MIN: Performed by: UROLOGY

## 2019-10-30 PROCEDURE — 81025 URINE PREGNANCY TEST: CPT | Performed by: ANESTHESIOLOGY

## 2019-10-30 PROCEDURE — 25000125 ZZHC RX 250: Performed by: NURSE ANESTHETIST, CERTIFIED REGISTERED

## 2019-10-30 PROCEDURE — 71000012 ZZH RECOVERY PHASE 1 LEVEL 1 FIRST HR: Performed by: UROLOGY

## 2019-10-30 PROCEDURE — 71000013 ZZH RECOVERY PHASE 1 LEVEL 1 EA ADDTL HR: Performed by: UROLOGY

## 2019-10-30 DEVICE — LEAD INTERSTIM KIT 3889-28: Type: IMPLANTABLE DEVICE | Site: HIP | Status: FUNCTIONAL

## 2019-10-30 DEVICE — STIMULATOR NEURO INTER-STIM II 3058: Type: IMPLANTABLE DEVICE | Site: HIP | Status: FUNCTIONAL

## 2019-10-30 RX ORDER — ONDANSETRON 2 MG/ML
4 INJECTION INTRAMUSCULAR; INTRAVENOUS EVERY 30 MIN PRN
Status: DISCONTINUED | OUTPATIENT
Start: 2019-10-30 | End: 2019-10-30 | Stop reason: HOSPADM

## 2019-10-30 RX ORDER — MEPERIDINE HYDROCHLORIDE 25 MG/ML
12.5 INJECTION INTRAMUSCULAR; INTRAVENOUS; SUBCUTANEOUS
Status: DISCONTINUED | OUTPATIENT
Start: 2019-10-30 | End: 2019-10-30 | Stop reason: HOSPADM

## 2019-10-30 RX ORDER — ONDANSETRON 2 MG/ML
INJECTION INTRAMUSCULAR; INTRAVENOUS PRN
Status: DISCONTINUED | OUTPATIENT
Start: 2019-10-30 | End: 2019-10-30

## 2019-10-30 RX ORDER — GABAPENTIN 300 MG/1
300 CAPSULE ORAL ONCE
Status: COMPLETED | OUTPATIENT
Start: 2019-10-30 | End: 2019-10-30

## 2019-10-30 RX ORDER — SODIUM CHLORIDE, SODIUM LACTATE, POTASSIUM CHLORIDE, CALCIUM CHLORIDE 600; 310; 30; 20 MG/100ML; MG/100ML; MG/100ML; MG/100ML
INJECTION, SOLUTION INTRAVENOUS CONTINUOUS
Status: DISCONTINUED | OUTPATIENT
Start: 2019-10-30 | End: 2019-10-30 | Stop reason: HOSPADM

## 2019-10-30 RX ORDER — BUPIVACAINE HYDROCHLORIDE AND EPINEPHRINE 2.5; 5 MG/ML; UG/ML
INJECTION, SOLUTION INFILTRATION; PERINEURAL PRN
Status: DISCONTINUED | OUTPATIENT
Start: 2019-10-30 | End: 2019-10-30 | Stop reason: HOSPADM

## 2019-10-30 RX ORDER — FENTANYL CITRATE 50 UG/ML
INJECTION, SOLUTION INTRAMUSCULAR; INTRAVENOUS PRN
Status: DISCONTINUED | OUTPATIENT
Start: 2019-10-30 | End: 2019-10-30

## 2019-10-30 RX ORDER — FENTANYL CITRATE 0.05 MG/ML
25-50 INJECTION, SOLUTION INTRAMUSCULAR; INTRAVENOUS
Status: DISCONTINUED | OUTPATIENT
Start: 2019-10-30 | End: 2019-10-30 | Stop reason: HOSPADM

## 2019-10-30 RX ORDER — SODIUM CHLORIDE, SODIUM LACTATE, POTASSIUM CHLORIDE, CALCIUM CHLORIDE 600; 310; 30; 20 MG/100ML; MG/100ML; MG/100ML; MG/100ML
INJECTION, SOLUTION INTRAVENOUS CONTINUOUS PRN
Status: DISCONTINUED | OUTPATIENT
Start: 2019-10-30 | End: 2019-10-30

## 2019-10-30 RX ORDER — EPHEDRINE SULFATE 50 MG/ML
INJECTION, SOLUTION INTRAMUSCULAR; INTRAVENOUS; SUBCUTANEOUS PRN
Status: DISCONTINUED | OUTPATIENT
Start: 2019-10-30 | End: 2019-10-30

## 2019-10-30 RX ORDER — CEPHALEXIN 500 MG/1
500 CAPSULE ORAL 3 TIMES DAILY
Qty: 15 CAPSULE | Refills: 0 | Status: SHIPPED | OUTPATIENT
Start: 2019-10-30 | End: 2019-11-04

## 2019-10-30 RX ORDER — CEFAZOLIN SODIUM 2 G/100ML
2 INJECTION, SOLUTION INTRAVENOUS
Status: COMPLETED | OUTPATIENT
Start: 2019-10-30 | End: 2019-10-30

## 2019-10-30 RX ORDER — LIDOCAINE 40 MG/G
CREAM TOPICAL
Status: DISCONTINUED | OUTPATIENT
Start: 2019-10-30 | End: 2019-10-30 | Stop reason: HOSPADM

## 2019-10-30 RX ORDER — LIDOCAINE HYDROCHLORIDE 20 MG/ML
INJECTION, SOLUTION INFILTRATION; PERINEURAL PRN
Status: DISCONTINUED | OUTPATIENT
Start: 2019-10-30 | End: 2019-10-30

## 2019-10-30 RX ORDER — NALOXONE HYDROCHLORIDE 0.4 MG/ML
.1-.4 INJECTION, SOLUTION INTRAMUSCULAR; INTRAVENOUS; SUBCUTANEOUS
Status: DISCONTINUED | OUTPATIENT
Start: 2019-10-30 | End: 2019-10-30 | Stop reason: HOSPADM

## 2019-10-30 RX ORDER — FENTANYL CITRATE 0.05 MG/ML
50-100 INJECTION, SOLUTION INTRAMUSCULAR; INTRAVENOUS
Status: DISCONTINUED | OUTPATIENT
Start: 2019-10-30 | End: 2019-10-30 | Stop reason: HOSPADM

## 2019-10-30 RX ORDER — ONDANSETRON 4 MG/1
4 TABLET, ORALLY DISINTEGRATING ORAL EVERY 30 MIN PRN
Status: DISCONTINUED | OUTPATIENT
Start: 2019-10-30 | End: 2019-10-30 | Stop reason: HOSPADM

## 2019-10-30 RX ORDER — OXYCODONE HYDROCHLORIDE 5 MG/1
10 TABLET ORAL ONCE
Status: COMPLETED | OUTPATIENT
Start: 2019-10-30 | End: 2019-10-30

## 2019-10-30 RX ORDER — PROPOFOL 10 MG/ML
INJECTION, EMULSION INTRAVENOUS CONTINUOUS PRN
Status: DISCONTINUED | OUTPATIENT
Start: 2019-10-30 | End: 2019-10-30

## 2019-10-30 RX ORDER — HYDROMORPHONE HYDROCHLORIDE 1 MG/ML
.3-.5 INJECTION, SOLUTION INTRAMUSCULAR; INTRAVENOUS; SUBCUTANEOUS EVERY 10 MIN PRN
Status: DISCONTINUED | OUTPATIENT
Start: 2019-10-30 | End: 2019-10-30 | Stop reason: HOSPADM

## 2019-10-30 RX ORDER — HYDROMORPHONE HYDROCHLORIDE 2 MG/1
4 TABLET ORAL EVERY 6 HOURS PRN
Qty: 20 TABLET | Refills: 0 | Status: SHIPPED | OUTPATIENT
Start: 2019-10-30 | End: 2019-11-02

## 2019-10-30 RX ADMIN — PHENYLEPHRINE HYDROCHLORIDE 100 MCG: 10 INJECTION INTRAVENOUS at 11:47

## 2019-10-30 RX ADMIN — CEFAZOLIN SODIUM 2 G: 2 INJECTION, SOLUTION INTRAVENOUS at 11:20

## 2019-10-30 RX ADMIN — FENTANYL CITRATE 50 MCG: 50 INJECTION, SOLUTION INTRAMUSCULAR; INTRAVENOUS at 11:30

## 2019-10-30 RX ADMIN — DEXMEDETOMIDINE HYDROCHLORIDE 4 MCG: 100 INJECTION, SOLUTION INTRAVENOUS at 11:35

## 2019-10-30 RX ADMIN — PHENYLEPHRINE HYDROCHLORIDE 100 MCG: 10 INJECTION INTRAVENOUS at 11:36

## 2019-10-30 RX ADMIN — ONDANSETRON 4 MG: 2 INJECTION INTRAMUSCULAR; INTRAVENOUS at 11:23

## 2019-10-30 RX ADMIN — FENTANYL CITRATE 50 MCG: 0.05 INJECTION, SOLUTION INTRAMUSCULAR; INTRAVENOUS at 12:36

## 2019-10-30 RX ADMIN — DEXMEDETOMIDINE HYDROCHLORIDE 8 MCG: 100 INJECTION, SOLUTION INTRAVENOUS at 11:25

## 2019-10-30 RX ADMIN — GABAPENTIN 300 MG: 300 CAPSULE ORAL at 10:18

## 2019-10-30 RX ADMIN — SODIUM CHLORIDE, POTASSIUM CHLORIDE, SODIUM LACTATE AND CALCIUM CHLORIDE: 600; 310; 30; 20 INJECTION, SOLUTION INTRAVENOUS at 11:13

## 2019-10-30 RX ADMIN — Medication 5 MG: at 11:44

## 2019-10-30 RX ADMIN — FENTANYL CITRATE 50 MCG: 50 INJECTION, SOLUTION INTRAMUSCULAR; INTRAVENOUS at 12:27

## 2019-10-30 RX ADMIN — FENTANYL CITRATE 50 MCG: 0.05 INJECTION, SOLUTION INTRAMUSCULAR; INTRAVENOUS at 13:26

## 2019-10-30 RX ADMIN — FENTANYL CITRATE 50 MCG: 50 INJECTION, SOLUTION INTRAMUSCULAR; INTRAVENOUS at 11:15

## 2019-10-30 RX ADMIN — SODIUM CHLORIDE, POTASSIUM CHLORIDE, SODIUM LACTATE AND CALCIUM CHLORIDE: 600; 310; 30; 20 INJECTION, SOLUTION INTRAVENOUS at 13:01

## 2019-10-30 RX ADMIN — FENTANYL CITRATE 50 MCG: 50 INJECTION, SOLUTION INTRAMUSCULAR; INTRAVENOUS at 12:20

## 2019-10-30 RX ADMIN — LIDOCAINE HYDROCHLORIDE 60 MG: 20 INJECTION, SOLUTION INFILTRATION; PERINEURAL at 11:15

## 2019-10-30 RX ADMIN — PHENYLEPHRINE HYDROCHLORIDE 100 MCG: 10 INJECTION INTRAVENOUS at 11:30

## 2019-10-30 RX ADMIN — PHENYLEPHRINE HYDROCHLORIDE 100 MCG: 10 INJECTION INTRAVENOUS at 11:40

## 2019-10-30 RX ADMIN — MIDAZOLAM 1 MG: 1 INJECTION INTRAMUSCULAR; INTRAVENOUS at 11:30

## 2019-10-30 RX ADMIN — OXYCODONE HYDROCHLORIDE 10 MG: 5 TABLET ORAL at 13:21

## 2019-10-30 RX ADMIN — PROPOFOL 150 MCG/KG/MIN: 10 INJECTION, EMULSION INTRAVENOUS at 11:15

## 2019-10-30 RX ADMIN — MIDAZOLAM 1 MG: 1 INJECTION INTRAMUSCULAR; INTRAVENOUS at 11:15

## 2019-10-30 RX ADMIN — FENTANYL CITRATE 50 MCG: 0.05 INJECTION, SOLUTION INTRAMUSCULAR; INTRAVENOUS at 12:44

## 2019-10-30 ASSESSMENT — LIFESTYLE VARIABLES: TOBACCO_USE: 0

## 2019-10-30 ASSESSMENT — MIFFLIN-ST. JEOR: SCORE: 1132.13

## 2019-10-30 ASSESSMENT — COPD QUESTIONNAIRES: COPD: 0

## 2019-10-30 NOTE — OP NOTE
Procedure Date: 10/30/2019      PROCEDURE:  InterStim lead and generator replacement.      PREOPERATIVE DIAGNOSIS:  Chronic overactive bladder, frequency, urgency; previous InterStim with battery needing replacement and fractured lead.         POSTOPERATIVE DIAGNOSIS:  Chronic overactive bladder, frequency, urgency; previous InterStim with battery needing replacement and fractured lead.      OPERATIVE NOTE:  Informed consent was obtained from the patient.  She was brought into the operating room, placed in the prone position, given intravenous sedation.  Sterile prep and drape were applied and surgical timeout was taken.  The area over the generator was infiltrated with a mixture of 1% lidocaine and 0.5% Marcaine with epinephrine.  Skin incision was made, dissection carried through the subcutaneous to the generator pocket, which was opened, and the generator was delivered out through the wound.  The lead was clamped and then divided.  The area over the previously lead placement site was also infiltrated with the same anesthetic solution, and then a skin incision was made here and dissection was carried down until the lead was visualized.  The lead was unclamped at the pocket and then brought subcutaneously to the second incision.  Lead was again clamped and then using gentle traction, it was removed from the patient intact.  Fluoroscopy was then used to localize the S3 foramen medial edge.  Guidewire was placed through the incision to appropriate depth in the S3 foramen.  AP and lateral films taken showing good medial position.  Testing was done showing excellent motor response at low voltages.  The stylet was removed and a guidewire was placed through the needle.  The needle was removed, and then the lead placement sheath was placed over the guidewire to an appropriate depth using fluoroscopy.  The lead was then brought through the placement sheath after removing the stylet and guidewire.  Showed good positioning  on both AP and lateral films.  Testing was done showing good motor response at low voltages.  The lead was deployed by removing the placement sheath under fluoroscopy.  Hard AP and lateral films were then taken.  The lead was then tunneled to the pocket site using the tunneling device and then attached to the new generator.  Generator was placed into the pocket and impedance testing was done showing excellent numbers in all contact points.  Hemostasis was checked and found to be good.  The pocket site was closed with a running 2-0 Vicryl for the deep layer and 4-0 undyed Vicryl for the skin layer.  The lead placement site was closed with a 2-0 Vicryl mattress stitch in the subcutaneous and then two 4-0 undyed Vicryl mattress sutures for the skin.  Sterile Tegaderms were applied using benzoin and the procedure was terminated at that point.  She tolerated the procedure well and there were no complications.  Sponge and needle counts were correct prior to and after closure, and blood loss was 2 mL.      She will be discharged out from the recovery room.  She should follow up end of next week for dressing removal and wound check in the office.         AUSTIN DUTTON MD             D: 10/30/2019   T: 10/30/2019   MT: DENNIS      Name:     ALEXANDRE HUGGINS   MRN:      [id number]        Account:        [id number]   :      1975           Procedure Date: 10/30/2019      Document: [id number]       cc: Austin Dutton MD

## 2019-10-30 NOTE — ANESTHESIA CARE TRANSFER NOTE
Patient: Kely Preston    Procedure(s):  INTERSTEM GENERATOR AND LEAD REPLACEMENT    Diagnosis: Urinary incontinence, unspecified type [R32]  Diagnosis Additional Information: No value filed.    Anesthesia Type:   MAC     Note:  Airway :Nasal Cannula  Patient transferred to:PACU  Handoff Report: Identifed the Patient, Identified the Reponsible Provider, Reviewed the pertinent medical history, Discussed the surgical course, Reviewed Intra-OP anesthesia mangement and issues during anesthesia, Set expectations for post-procedure period and Allowed opportunity for questions and acknowledgement of understanding      Vitals: (Last set prior to Anesthesia Care Transfer)    CRNA VITALS  10/30/2019 1157 - 10/30/2019 1231      10/30/2019             Resp Rate (set):  10                Electronically Signed By: JOHN PAUL Hobbs CRNA  October 30, 2019  12:31 PM

## 2019-10-30 NOTE — OP NOTE
[facility]rology Brief Operative Note    Pre-operative diagnosis: Urinary incontinence, unspecified type [R32]   Post-operative diagnosis: Same   Procedure: Procedure(s):  INTERSTEM GENERATOR AND LEAD REPLACEMENT   Surgeon: Austin Newsome MD, MD   Assistant(s): none   Anesthesia: Local anesthesia with sedation   Estimated blood loss: Minimal   Total IV fluids: (See anesthesia record)   Blood transfusion: No transfusion was given during surgery   Total urine output: None   Drains: None   Specimens: None   Implants: New interstim lead and generator   Findings: Old generator and lead removed intact.   Complications: None   Condition: Stable   Comments: See dictated operative report for full details.    Austin Newsome MD, MD

## 2019-10-30 NOTE — ANESTHESIA PREPROCEDURE EVALUATION
Anesthesia Pre-Procedure Evaluation    Patient: Kely Preston   MRN: [id number] : 1975          Preoperative Diagnosis: Urinary incontinence, unspecified type [R32]    Procedure(s):  INTERSTEM GENERATOR AND LEAD REPLACEMENT    Past Medical History:   Diagnosis Date     Anxiety      Chronic pain syndrome      Depressive disorder      Irregular heart beat      Migraine      Myelomalacia (H)      Neuropathy      Other chronic pain      Unspecified site of spinal cord injury without evidence of spinal bone injury      Past Surgical History:   Procedure Laterality Date     CARDIAC SURGERY      ablation for arrhythmia     GYN SURGERY      c section x2     IMPLANT STIMULATOR AND LEADS SACRAL NERVE (STAGE ONE AND TWO)  2014    Procedure: IMPLANT STIMULATOR AND LEADS SACRAL NERVE (STAGE ONE AND TWO);  Surgeon: Austin Newsome MD;  Location: [facility] OR     ORTHOPEDIC SURGERY      carpal tunnel release, dequervains     SOFT TISSUE SURGERY      liposuction       Anesthesia Evaluation     . Pt has had prior anesthetic. Type: General    No history of anesthetic complications          ROS/MED HX    ENT/Pulmonary:      (-) tobacco use, asthma, COPD and sleep apnea   Neurologic:     (+)neuropathy - right arm and leg, migraines, Spinal cord injury year sustained:  without autonomic hyperflexia symptoms,     Cardiovascular:     (+) ----. : . . . :. Irregular Heartbeat/Palpitations, .      (-) hypertension, CAD and CHF   METS/Exercise Tolerance:     Hematologic:         Musculoskeletal:         GI/Hepatic:        (-) GERD and liver disease   Renal/Genitourinary:      (-) renal disease   Endo:      (-) Type I DM and Type II DM   Psychiatric:     (+) psychiatric history depression      Infectious Disease:         Malignancy:         Other:                          Physical Exam  Normal systems: cardiovascular, pulmonary and dental    Airway   Mallampati: II  TM distance: >3 FB  Neck ROM: full    Dental  "    Cardiovascular       Pulmonary             Lab Results   Component Value Date    WBC 10.7 06/03/2008    HGB 11.9 (L) 02/07/2014    HCT 36.1 02/07/2014     06/03/2008     (L) 02/07/2014    POTASSIUM 3.7 05/28/2015    CHLORIDE 99 02/07/2014    CO2 22 06/03/2008    BUN 14 02/07/2014    CR 0.79 11/09/2018    GLC 98 05/28/2015    JONATAN 8.7 02/07/2014    ALBUMIN 3.8 04/20/2016    PROTTOTAL 6.4 (L) 04/20/2016    ALT 12 04/20/2016    AST 13 04/20/2016    ALKPHOS 32 (L) 04/20/2016    BILITOTAL 0.7 04/20/2016    BILIDIRECT 0.11 08/21/2013    HCG Negative 10/30/2019    CKMB 0.9 02/07/2014       Preop Vitals  BP Readings from Last 3 Encounters:   10/30/19 119/75   05/07/19 97/68   05/08/18 107/70    Pulse Readings from Last 3 Encounters:   10/30/19 79   05/07/19 92   05/08/18 62      Resp Readings from Last 3 Encounters:   10/30/19 16   06/23/16 16   04/20/16 16    SpO2 Readings from Last 3 Encounters:   10/30/19 99%   05/07/19 99%   05/08/18 99%      Temp Readings from Last 1 Encounters:   10/30/19 36.2  C (97.1  F) (Oral)    Ht Readings from Last 1 Encounters:   10/30/19 1.575 m (5' 2\")      Wt Readings from Last 1 Encounters:   10/30/19 53.4 kg (117 lb 11.2 oz)    Estimated body mass index is 21.53 kg/m  as calculated from the following:    Height as of this encounter: 1.575 m (5' 2\").    Weight as of this encounter: 53.4 kg (117 lb 11.2 oz).       Anesthesia Plan      History & Physical Review  History and physical reviewed and following examination; no interval change.    ASA Status:  2 .    NPO Status:  > 8 hours    Plan for MAC with Intravenous induction. Maintenance will be Balanced.  Reason for MAC:  Deep or markedly invasive procedure (G8)  PONV prophylaxis:  Ondansetron (or other 5HT-3) and Dexamethasone or Solumedrol       Postoperative Care  Postoperative pain management:  Multi-modal analgesia.  Plan for postoperative opioid use.    Consents  Anesthetic plan, risks, benefits and alternatives " discussed with:  Patient..                 Teofilo Coppola MD

## 2019-10-30 NOTE — ANESTHESIA POSTPROCEDURE EVALUATION
Patient: Kely Preston    Procedure(s):  INTERSTEM GENERATOR AND LEAD REPLACEMENT    Diagnosis:Urinary incontinence, unspecified type [R32]  Diagnosis Additional Information: No value filed.    Anesthesia Type:  MAC    Note:  Anesthesia Post Evaluation    Patient location during evaluation: PACU  Patient participation: Able to fully participate in evaluation  Level of consciousness: awake and alert  Pain management: adequate  Airway patency: patent  Cardiovascular status: acceptable  Respiratory status: acceptable  Hydration status: acceptable  PONV: none     Anesthetic complications: None          Last vitals:  Vitals:    10/30/19 0911 10/30/19 1229 10/30/19 1240   BP: 119/75 116/83    Pulse: 79 71    Resp: 16 28 16   Temp: 36.2  C (97.1  F) 36.4  C (97.5  F)    SpO2: 99% 100% 98%         Electronically Signed By: Teofilo Coppola MD  October 30, 2019  12:47 PM

## 2019-10-30 NOTE — ANESTHESIA POSTPROCEDURE EVALUATION
Patient: Kely Preston    Procedure(s):  INTERSTEM GENERATOR AND LEAD REPLACEMENT    Diagnosis:Urinary incontinence, unspecified type [R32]  Diagnosis Additional Information: No value filed.    Anesthesia Type:  MAC    Note:  Anesthesia Post Evaluation    Patient location during evaluation: PACU  Patient participation: Able to fully participate in evaluation  Level of consciousness: awake and alert  Pain management: adequate  Airway patency: patent  Cardiovascular status: acceptable  Respiratory status: acceptable  Hydration status: acceptable  PONV: none     Anesthetic complications: None          Last vitals:  Vitals:    10/30/19 1300 10/30/19 1315 10/30/19 1325   BP: 104/86 93/80    Pulse: 65 70    Resp: 16 14 16   Temp:      SpO2: 98% 96% 100%         Electronically Signed By: Teofilo Coppola MD  October 30, 2019  2:01 PM

## 2019-10-30 NOTE — DISCHARGE INSTRUCTIONS
Same Day Surgery Discharge Instructions for  Sedation and General Anesthesia       It's not unusual to feel dizzy, light-headed or faint for up to 24 hours after surgery or while taking pain medication.  If you have these symptoms: sit for a few minutes before standing and have someone assist you when you get up to walk or use the bathroom.      You should rest and relax for the next 24 hours. We recommend you make arrangements to have an adult stay with you for at least 24 hours after your discharge.  Avoid hazardous and strenuous activity.      DO NOT DRIVE any vehicle or operate mechanical equipment for 24 hours following the end of your surgery.  Even though you may feel normal, your reactions may be affected by the medication you have received.      Do not drink alcoholic beverages for 24 hours following surgery.       Slowly progress to your regular diet as you feel able. It's not unusual to feel nauseated and/or vomit after receiving anesthesia.  If you develop these symptoms, drink clear liquids (apple juice, ginger ale, broth, 7-up, etc. ) until you feel better.  If your nausea and vomiting persists for 24 hours, please notify your surgeon.        All narcotic pain medications, along with inactivity and anesthesia, can cause constipation. Drinking plenty of liquids and increasing fiber intake will help.      For any questions of a medical nature, call your surgeon.      Do not make important decisions for 24 hours.      If you had general anesthesia, you may have a sore throat for a couple of days related to the breathing tube used during surgery.  You may use Cepacol lozenges to help with this discomfort.  If it worsens or if you develop a fever, contact your surgeon.       If you feel your pain is not well managed with the pain medications prescribed by your surgeon, please contact your surgeon's office to let them know so they can address your concerns.     InterStim Discharge Instruction        General  information:      Your incision may be sore and tender for 2 weeks.     You may shower starting tomorrow ... Per Dr. Mcfarlane a front shower only until your follow up appointment and they remove the dressing.    Your incision was closed using a liquid adhesive. It's important not to scratch, rub or pick the film.  Also, do not apply lotions or creams to the area.    Activity:        Reduce normal daily activity for the first week. No excessive bending, twisting or bouncing.     Walking and light exercise is permissible.    No lifting, pulling or pushing objects heavier than 10 lbs. No vacuuming, carrying groceries or laundry baskets for 3-4 weeks.     No strenuous exercise for 3-4 weeks    No sexual intercourse for 1-2 weeks.        Contact your surgeon for the following:      A temperature above 101 F.     Pain not relieved by medication    Rash    Nausea or vomiting    Swelling, excessive drainage, warmth at the incisional site      Follow up care:      The [employer] representative will instruct you about your InterStim device. Please contact them with any questions.

## 2020-04-28 ENCOUNTER — TELEPHONE (OUTPATIENT)
Dept: NEUROLOGY | Facility: CLINIC | Age: 45
End: 2020-04-28

## 2020-04-28 NOTE — TELEPHONE ENCOUNTER
I called patient and reschedule her to video appt. Pt will use her computer-email verified    Stephany Varghese LPN

## 2020-05-05 ENCOUNTER — VIRTUAL VISIT (OUTPATIENT)
Dept: NEUROLOGY | Facility: CLINIC | Age: 45
End: 2020-05-05
Payer: COMMERCIAL

## 2020-05-05 DIAGNOSIS — M79.2 NEUROPATHIC PAIN: ICD-10-CM

## 2020-05-05 PROCEDURE — 99212 OFFICE O/P EST SF 10 MIN: CPT | Mod: 95 | Performed by: PSYCHIATRY & NEUROLOGY

## 2020-05-05 RX ORDER — GABAPENTIN 300 MG/1
CAPSULE ORAL
Qty: 540 CAPSULE | Refills: 3 | Status: SHIPPED | OUTPATIENT
Start: 2020-05-05 | End: 2022-03-22

## 2020-05-05 NOTE — PROGRESS NOTES
"Kely Preston is a 44 year old female who is being evaluated via a billable video visit.      The patient has been notified of following:     \"This video visit will be conducted via a call between you and your physician/provider. We have found that certain health care needs can be provided without the need for an in-person physical exam.  This service lets us provide the care you need with a video conversation.  If a prescription is necessary we can send it directly to your pharmacy.  If lab work is needed we can place an order for that and you can then stop by our lab to have the test done at a later time.    Video visits are billed at different rates depending on your insurance coverage.  Please reach out to your insurance provider with any questions.    If during the course of the call the physician/provider feels a video visit is not appropriate, you will not be charged for this service.\"    Patient has given verbal consent for Video visit? Yes    How would you like to obtain your AVS?   Patient would like the video invitation sent by: Send to e-mail at: [email or web address]    Will anyone else be joining your video visit? No  {If patient encounters technical issues they should call [phone] :[phone]}      Video-Visit Details    Type of service:  Video Visit    Video Start Time: 10:40  Video End Time: 10:54 AM    Originating Location (pt. Location): Home    Distant Location (provider location):  [facility]     Platform used for Video Visit: [email or web address]     [id number]    Fredi Day MD      "

## 2020-05-06 NOTE — PROGRESS NOTES
"May 5, 2020            Kenneth G Pallas MD   [facility]   [address]   [place], [place] [id number]      Patient:  Kely Preston   MRN:  [id number]   :  1975      Dear Pallas:      I saw Kely Preston in followup in a video telemedicine visit today.  As you know, I have seen her intermittently currently for management of a cervical myelopathy with neuropathic pain.  Ms. Preston reports that she is doing well.  Her pain is under acceptable control.  She has had a recent exacerbation of pain in an area of sensory deficit in the right lower limb which is also a symptom of her myelopathy.  This does flare on occasion but has not resulted in meaningful disability.  Her current regimen of medication and therapeutic interventions including a spinal cord stimulator have worked quite well for her.  She has no functional deficits.  She recently started a new job which has been beneficial for mood as well.      A limited examination was performed by video.  She demonstrates excellent use of the proximal and distal upper limbs with normal coordination and no atrophy.  She again demonstrates a modest reduction in sensation over the lateral forearm and hand which is unchanged from prior.      Ms. Preston will continue on her current medications.  I will see her infrequently as I continue to prescribe her gabapentin.  No further recommendations are indicated currently.            Sincerely,      GIA ABARCA MD             D: 2020   T: 2020   MT: AGUSTO      Name:     KELY PRESTON   MRN:      [id number]        Account:      [id number]   :      1975      Document: [id number]       cc: Kenneth Pallas MD     Kely Preston is a 44 year old female who is being evaluated via a billable video visit.      The patient has been notified of following:     \"This video visit will be conducted via a call between you and your physician/provider. We have found that certain health care " "needs can be provided without the need for an in-person physical exam.  This service lets us provide the care you need with a video conversation.  If a prescription is necessary we can send it directly to your pharmacy.  If lab work is needed we can place an order for that and you can then stop by our lab to have the test done at a later time.    Video visits are billed at different rates depending on your insurance coverage.  Please reach out to your insurance provider with any questions.    If during the course of the call the physician/provider feels a video visit is not appropriate, you will not be charged for this service.\"    Patient has given verbal consent for Video visit? Yes    How would you like to obtain your AVS?   Patient would like the video invitation sent by: Send to e-mail at: [email or web address]    Will anyone else be joining your video visit? No        Video-Visit Details    Type of service:  Video Visit    Video Start Time: 10:40  Video End Time: 10:54 AM    Originating Location (pt. Location): Home    Distant Location (provider location):  [facility]     Platform used for Video Visit: Shyanne"

## 2020-10-02 ENCOUNTER — TELEPHONE (OUTPATIENT)
Dept: NEUROLOGY | Facility: CLINIC | Age: 45
End: 2020-10-02

## 2020-10-02 NOTE — TELEPHONE ENCOUNTER
I called patient and rescheduled her in clinic appt to a video appt. Per Dr. Day.  Pt was very pleased to reschedule to video appt. Pt will have link emailed as she does not have Permission[employer] access.    Stephany Varghese LPN

## 2020-10-06 ENCOUNTER — VIRTUAL VISIT (OUTPATIENT)
Dept: NEUROLOGY | Facility: CLINIC | Age: 45
End: 2020-10-06
Payer: COMMERCIAL

## 2020-10-06 DIAGNOSIS — M79.2 NEUROPATHIC PAIN: Primary | ICD-10-CM

## 2020-10-06 PROCEDURE — 99213 OFFICE O/P EST LOW 20 MIN: CPT | Mod: 95 | Performed by: PSYCHIATRY & NEUROLOGY

## 2020-10-06 ASSESSMENT — PATIENT HEALTH QUESTIONNAIRE - PHQ9: SUM OF ALL RESPONSES TO PHQ QUESTIONS 1-9: 15

## 2020-10-06 NOTE — PATIENT INSTRUCTIONS
1. Reduce gabapentin to 900 mg twice daily.  2. Call or MyChart in two weeks to let me know how you are doing with that. If you are doing well I will recommend reducing to 600 mg twice daily.  3. We will send another lab order for a blood test.

## 2020-10-06 NOTE — Clinical Note
See AVS. Please send lab order to patient or to [facility] and ask them to fax us the result.

## 2020-10-06 NOTE — LETTER
"    10/6/2020         RE: Kely Preston  [address] [address]        Dear Colleague,    Thank you for referring your patient, Kely Preston, to the [facility]UROLOG[facility]INIC [place]. Please see a copy of my visit note below.    Kely Preston is a 44 year old female who is being evaluated via a billable video visit.      The patient has been notified of following:     \"This video visit will be conducted via a call between you and your physician/provider. We have found that certain health care needs can be provided without the need for an in-person physical exam.  This service lets us provide the care you need with a video conversation.  If a prescription is necessary we can send it directly to your pharmacy.  If lab work is needed we can place an order for that and you can then stop by our lab to have the test done at a later time.    Video visits are billed at different rates depending on your insurance coverage.  Please reach out to your insurance provider with any questions.    If during the course of the call the physician/provider feels a video visit is not appropriate, you will not be charged for this service.\"    Patient has given verbal consent for Video visit? Yes  How would you like to obtain your AVS? MyChart  If you are dropped from the video visit, the video invite should be resent to: Send to e-mail at: [email or web address]  Will anyone else be joining your video visit? No       Video failed - conducted by telephone.    SCS and CBD are working well. Changes in weather still increases pain. Work from home and home schooling increase stress. Sensory deficits unchanged. Hand throbbing persists. Depression worsened this summer and has been less compliant with medications. Pain is worse when not taking it, but may not need TID dosing.     Still seeing [facility] - seeing therapist every 2-3 weeks and with psych PA. Does not feel GPN " affects mood.     Will reduce dose (see AVS).    11:40 - 11:55    Fredi Day M.D.                    Again, thank you for allowing me to participate in the care of your patient.        Sincerely,        Fredi Day MD

## 2020-10-06 NOTE — PROGRESS NOTES
"Kely Preston is a 44 year old female who is being evaluated via a billable video visit.      The patient has been notified of following:     \"This video visit will be conducted via a call between you and your physician/provider. We have found that certain health care needs can be provided without the need for an in-person physical exam.  This service lets us provide the care you need with a video conversation.  If a prescription is necessary we can send it directly to your pharmacy.  If lab work is needed we can place an order for that and you can then stop by our lab to have the test done at a later time.    Video visits are billed at different rates depending on your insurance coverage.  Please reach out to your insurance provider with any questions.    If during the course of the call the physician/provider feels a video visit is not appropriate, you will not be charged for this service.\"    Patient has given verbal consent for Video visit? Yes  How would you like to obtain your AVS? MyChart  If you are dropped from the video visit, the video invite should be resent to: Send to e-mail at: [email or web address]  Will anyone else be joining your video visit? No       Video failed - conducted by telephone.    SCS and CBD are working well. Changes in weather still increases pain. Work from home and home schooling increase stress. Sensory deficits unchanged. Hand throbbing persists. Depression worsened this summer and has been less compliant with medications. Pain is worse when not taking it, but may not need TID dosing.     Still seeing [facility] - seeing therapist every 2-3 weeks and with psych PA. Does not feel GPN affects mood.     Will reduce dose (see AVS).    11:40 - 11:55    Fredi Day M.D.                "

## 2020-10-14 ENCOUNTER — TELEPHONE (OUTPATIENT)
Dept: NEUROLOGY | Facility: CLINIC | Age: 45
End: 2020-10-14

## 2020-10-14 NOTE — TELEPHONE ENCOUNTER
Lab orders faxed to [facility] lab at [phone] and requested results be called or faxed back to us at [phone]

## 2020-12-11 ENCOUNTER — TELEPHONE (OUTPATIENT)
Dept: NEUROLOGY | Facility: CLINIC | Age: 45
End: 2020-12-11

## 2020-12-11 NOTE — TELEPHONE ENCOUNTER
[facility] Call Center    Phone Message    May a detailed message be left on voicemail: yes     Reason for Call: Patient states she was told by Dr. Day to call clinic back when she starts decreasing her gabapentin medication to let them know how it is going.  Patient calling update to let the clinic know, she has been taking 3 pills, twice a day.  Patient also states she needs a call back from the nurse to discuss labs orders. Thank you.    Action Taken: Message routed to:  Adult Clinics: Neurology p [id number]    Travel Screening: Not Applicable

## 2020-12-14 NOTE — TELEPHONE ENCOUNTER
RN spoke to the pt who reports that since decreasing her Gabapentin dose to 3 caps twice daily, she seems to be doing fine. She does not feel like this change has made her pain any worse. She would like to stay at this dose for now and let us know of any changes in the meantime.   She would like the lab order from 10/6/20 mailed to her home so she can go to her local clinic and have this completed.     Kely Hopkins, YONISCC  Neurology

## 2021-03-19 ENCOUNTER — TRANSFERRED RECORDS (OUTPATIENT)
Dept: HEALTH INFORMATION MANAGEMENT | Facility: CLINIC | Age: 46
End: 2021-03-19

## 2021-03-19 LAB
CREAT SERPL-MCNC: 1.02 MG/DL (ref 0.57–1.11)
GFR SERPL CREATININE-BSD FRML MDRD: 58 ML/MIN/1.73M2

## 2021-04-06 ENCOUNTER — OFFICE VISIT (OUTPATIENT)
Dept: NEUROLOGY | Facility: CLINIC | Age: 46
End: 2021-04-06
Payer: COMMERCIAL

## 2021-04-06 VITALS
SYSTOLIC BLOOD PRESSURE: 118 MMHG | DIASTOLIC BLOOD PRESSURE: 56 MMHG | OXYGEN SATURATION: 94 % | RESPIRATION RATE: 12 BRPM | HEART RATE: 55 BPM

## 2021-04-06 DIAGNOSIS — M79.2 NEUROPATHIC PAIN: Primary | ICD-10-CM

## 2021-04-06 PROCEDURE — 99213 OFFICE O/P EST LOW 20 MIN: CPT | Performed by: PSYCHIATRY & NEUROLOGY

## 2021-04-06 NOTE — PATIENT INSTRUCTIONS
1. Try reducing gabapentin to 2 capsules twice a day. If that does not work you can go back up to 3 capsules. If pain is not worse on 2 capsules twice a day, you can reduce dose by 1 capsule per day each week until you are either off or find the lowest dose that you need.  2. Return 1 year.

## 2021-04-06 NOTE — PROGRESS NOTES
Return visit for 45 year old woman with cervical myelopathy. She reports stable symptoms. She reports that pain management is stable and permissive of function in ADLs. She reports no new neurologic deficits. She has tolerated a moderate reduction in gabapentin to BID dosing.      Mental state: Alert, appropriate, speech, language, and thought content normal.     Cranial nerves II-XII normal.    Sensory examination:     Right Left   Light touch Absent right hand and forearm Normal   Vibration (timed)     Vibration (Rydell-Seiffer) E6  6   6   Temp     Pin Normal Normal   Pos FE MCP    Legend:   MM = medial malleolus, TT = tibial tuberosity, K = patella, MCP = MCP joint  MF = mid-foot, DC = distal calf, MC = mid calf, PC = proximal calf      Motor examination:     Right Left   Shoulder abduction  5 5   Elbow extension 5 5   Elbow flexion 5 5   Wrist extension  5 5   Finger extension 5 5   FDI 5 5   APB 5 5   Hip flexion 5 5   Knee flexion 5 5   Knee extension 5 5   Dorsiflexion 5 5   Plantar flexion 5 5   A=atrophy    Tone normal     Reflexes:   Right Left   Biceps 2 2   BRD 2 2   Triceps 2 2   Mynor 2 2   Patellar 2 2   Achilles 2 2   Plantar Flexor Flexor   Clonus Absent Absent      Coordination:  Finger-nose normal.  Heel-shin normal.  RRMs normal.    Gait:  Normal.    Impression:  Condition is stable.   Creatinine normal.     Recommendations:  Continue gabapentin taper to minimum necessary dose.   RTC 1 year.    Fredi Day M.D.    20 minutes spent on the date of the encounter on chart review, history and examination, documentation and further activities as noted above.      rFedi Day M.D.

## 2021-04-06 NOTE — LETTER
4/6/2021         RE: Kely Preston  [address] [address]        Dear Colleague,    Thank you for referring your patient, Kely Preston, to the [facility]UROLOG[facility]INIC [place]. Please see a copy of my visit note below.    Return visit for 45 year old woman with cervical myelopathy. She reports stable symptoms. She reports that pain management is stable and permissive of function in ADLs. She reports no new neurologic deficits. She has tolerated a moderate reduction in gabapentin to BID dosing.      Mental state: Alert, appropriate, speech, language, and thought content normal.     Cranial nerves II-XII normal.    Sensory examination:     Right Left   Light touch Absent right hand and forearm Normal   Vibration (timed)     Vibration (Rydell-Seiffer) E6  6   6   Temp     Pin Normal Normal   Pos FE MCP    Legend:   MM = medial malleolus, TT = tibial tuberosity, K = patella, MCP = MCP joint  MF = mid-foot, DC = distal calf, MC = mid calf, PC = proximal calf      Motor examination:     Right Left   Shoulder abduction  5 5   Elbow extension 5 5   Elbow flexion 5 5   Wrist extension  5 5   Finger extension 5 5   FDI 5 5   APB 5 5   Hip flexion 5 5   Knee flexion 5 5   Knee extension 5 5   Dorsiflexion 5 5   Plantar flexion 5 5   A=atrophy    Tone normal     Reflexes:   Right Left   Biceps 2 2   BRD 2 2   Triceps 2 2   Mynor 2 2   Patellar 2 2   Achilles 2 2   Plantar Flexor Flexor   Clonus Absent Absent      Coordination:  Finger-nose normal.  Heel-shin normal.  RRMs normal.    Gait:  Normal.    Impression:  Condition is stable.   Creatinine normal.     Recommendations:  Continue gabapentin taper to minimum necessary dose.   RTC 1 year.    Fredi Day M.D.    20 minutes spent on the date of the encounter on chart review, history and examination, documentation and further activities as noted above.      Fredi Day M.D.          Again, thank you for allowing me to  participate in the care of your patient.        Sincerely,        Fredi Day MD

## 2021-06-08 ENCOUNTER — TRANSFERRED RECORDS (OUTPATIENT)
Dept: HEALTH INFORMATION MANAGEMENT | Facility: CLINIC | Age: 46
End: 2021-06-08

## 2021-08-10 ENCOUNTER — TRANSFERRED RECORDS (OUTPATIENT)
Dept: HEALTH INFORMATION MANAGEMENT | Facility: CLINIC | Age: 46
End: 2021-08-10

## 2021-12-08 ENCOUNTER — TRANSFERRED RECORDS (OUTPATIENT)
Dept: HEALTH INFORMATION MANAGEMENT | Facility: CLINIC | Age: 46
End: 2021-12-08
Payer: COMMERCIAL

## 2022-02-07 ENCOUNTER — TRANSFERRED RECORDS (OUTPATIENT)
Dept: HEALTH INFORMATION MANAGEMENT | Facility: CLINIC | Age: 47
End: 2022-02-07
Payer: COMMERCIAL

## 2022-03-22 ENCOUNTER — OFFICE VISIT (OUTPATIENT)
Dept: NEUROLOGY | Facility: CLINIC | Age: 47
End: 2022-03-22
Payer: COMMERCIAL

## 2022-03-22 VITALS
HEIGHT: 62 IN | BODY MASS INDEX: 22.45 KG/M2 | SYSTOLIC BLOOD PRESSURE: 105 MMHG | HEART RATE: 79 BPM | OXYGEN SATURATION: 99 % | DIASTOLIC BLOOD PRESSURE: 75 MMHG | WEIGHT: 122 LBS

## 2022-03-22 DIAGNOSIS — M79.2 NEUROPATHIC PAIN: ICD-10-CM

## 2022-03-22 PROCEDURE — 99213 OFFICE O/P EST LOW 20 MIN: CPT | Performed by: PSYCHIATRY & NEUROLOGY

## 2022-03-22 RX ORDER — GABAPENTIN 300 MG/1
600 CAPSULE ORAL 2 TIMES DAILY
Qty: 360 CAPSULE | Refills: 4 | Status: SHIPPED | OUTPATIENT
Start: 2022-03-22 | End: 2023-04-25

## 2022-03-22 ASSESSMENT — PAIN SCALES - GENERAL: PAINLEVEL: SEVERE PAIN (6)

## 2022-03-22 NOTE — PATIENT INSTRUCTIONS
Please notify me if there is any change in your condition; otherwise return in 1 year.  Please notify us when your creatinine level is drawn.

## 2022-03-22 NOTE — PROGRESS NOTES
"Kely Preston's goals for this visit include:   Chief Complaint   Patient presents with     RECHECK     I year return// cervical myelopathy       She requests these members of her care team be copied on today's visit information: yes    PCP: Pallas, Kenneth G    Referring Provider:  No referring provider defined for this encounter.    /75 (BP Location: Left arm, Patient Position: Sitting, Cuff Size: Adult Regular)   Pulse 79   Ht 1.575 m (5' 2\")   Wt 55.3 kg (122 lb)   SpO2 99%   BMI 22.31 kg/m      Do you need any medication refills at today's visit? Yes// gabapentin  Johan Kim CMA      "

## 2022-03-22 NOTE — LETTER
"    3/22/2022         RE: Kely Preston  [address] [address] [address]        Dear Colleague,    Thank you for referring your patient, Kely Preston, to the [facility]UR[facility] [place]. Please see a copy of my visit note below.    Kely Preston's goals for this visit include:   Chief Complaint   Patient presents with     RECHECK     I year return// cervical myelopathy       She requests these members of her care team be copied on today's visit information: yes    PCP: Pallas, Kenneth G    Referring Provider:  No referring provider defined for this encounter.    /75 (BP Location: Left arm, Patient Position: Sitting, Cuff Size: Adult Regular)   Pulse 79   Ht 1.575 m (5' 2\")   Wt 55.3 kg (122 lb)   SpO2 99%   BMI 22.31 kg/m      Do you need any medication refills at today's visit? Yes// gabapentin  Johan Kim CMA        Return visit for 46 year old woman with cervical myelopathy. Since her visit last year she experienced some shoulder paresthesias which resolved with adjustment of her SCS settings, otherwise her condition is stable.       Mental state: Alert, appropriate, speech, language, and thought content normal.     Cranial nerves II-XII normal.    Sensory examination:     Right Left   Light touch Normal Normal   Vibration (timed)     Vibration (Rydell-Seiffer)   MM6; 5th toe: 5   4   Temp     Pin Reduced RUE  Reduced right gt toe Normal   Pos     Legend:   MM = medial malleolus, TT = tibial tuberosity, K = patella, MCP = MCP joint  MF = mid-foot, DC = distal calf, MC = mid calf, PC = proximal calf      Motor examination:     Right Left   Shoulder abduction  5 5   Elbow extension 5 5   Elbow flexion 5 5   Wrist extension  5 5   Finger extension 5 5   FDI 5 5   APB 5 5   Hip flexion 5 5   Knee flexion 5 5   Knee extension 5 5   Dorsiflexion 5 5   Plantar flexion 5 5   A=atrophy    Tone normal     Reflexes:   Right Left   Biceps 2 2   BRD 2 2   Triceps 2 2 "   Mynor 2 2   Patellar 2 2   Achilles 2 2   Plantar Flexor Flexor   Clonus Absent Absent      Coordination:  Finger-nose normal.  Heel-shin normal.  RRMs normal.    Gait:  Normal.    Impression:  Stable examination.     Recommendations:  No new interventions. Continue current gabapentin dose.   Neurologic interventions are not required at this point, but she chooses to continue with periodic check-in.   RTC 1 year.      Fredi Day M.D.      20 minutes spent on the date of the encounter on chart review, history and examination, documentation and further activities as noted above.            Again, thank you for allowing me to participate in the care of your patient.        Sincerely,        Fredi Day MD

## 2022-03-22 NOTE — PROGRESS NOTES
Return visit for 46 year old woman with cervical myelopathy. Since her visit last year she experienced some shoulder paresthesias which resolved with adjustment of her SCS settings, otherwise her condition is stable.       Mental state: Alert, appropriate, speech, language, and thought content normal.     Cranial nerves II-XII normal.    Sensory examination:     Right Left   Light touch Normal Normal   Vibration (timed)     Vibration (Rydell-Seiffer)   MM6; 5th toe: 5   4   Temp     Pin Reduced RUE  Reduced right gt toe Normal   Pos     Legend:   MM = medial malleolus, TT = tibial tuberosity, K = patella, MCP = MCP joint  MF = mid-foot, DC = distal calf, MC = mid calf, PC = proximal calf      Motor examination:     Right Left   Shoulder abduction  5 5   Elbow extension 5 5   Elbow flexion 5 5   Wrist extension  5 5   Finger extension 5 5   FDI 5 5   APB 5 5   Hip flexion 5 5   Knee flexion 5 5   Knee extension 5 5   Dorsiflexion 5 5   Plantar flexion 5 5   A=atrophy    Tone normal     Reflexes:   Right Left   Biceps 2 2   BRD 2 2   Triceps 2 2   Mynor 2 2   Patellar 2 2   Achilles 2 2   Plantar Flexor Flexor   Clonus Absent Absent      Coordination:  Finger-nose normal.  Heel-shin normal.  RRMs normal.    Gait:  Normal.    Impression:  Stable examination.     Recommendations:  No new interventions. Continue current gabapentin dose.   Neurologic interventions are not required at this point, but she chooses to continue with periodic check-in.   RTC 1 year.      Fredi Day M.D.      20 minutes spent on the date of the encounter on chart review, history and examination, documentation and further activities as noted above.

## 2022-04-06 ENCOUNTER — TRANSFERRED RECORDS (OUTPATIENT)
Dept: HEALTH INFORMATION MANAGEMENT | Facility: CLINIC | Age: 47
End: 2022-04-06
Payer: COMMERCIAL

## 2022-06-07 ENCOUNTER — TRANSFERRED RECORDS (OUTPATIENT)
Dept: HEALTH INFORMATION MANAGEMENT | Facility: CLINIC | Age: 47
End: 2022-06-07
Payer: COMMERCIAL

## 2023-04-25 ENCOUNTER — OFFICE VISIT (OUTPATIENT)
Dept: NEUROLOGY | Facility: CLINIC | Age: 48
End: 2023-04-25
Payer: COMMERCIAL

## 2023-04-25 VITALS
HEART RATE: 77 BPM | DIASTOLIC BLOOD PRESSURE: 61 MMHG | WEIGHT: 122 LBS | OXYGEN SATURATION: 98 % | SYSTOLIC BLOOD PRESSURE: 95 MMHG | HEIGHT: 62 IN | BODY MASS INDEX: 22.45 KG/M2

## 2023-04-25 DIAGNOSIS — M79.2 NEUROPATHIC PAIN: ICD-10-CM

## 2023-04-25 LAB
CREAT SERPL-MCNC: 0.75 MG/DL (ref 0.52–1.04)
GFR SERPL CREATININE-BSD FRML MDRD: >90 ML/MIN/1.73M2

## 2023-04-25 PROCEDURE — 99213 OFFICE O/P EST LOW 20 MIN: CPT | Performed by: PSYCHIATRY & NEUROLOGY

## 2023-04-25 PROCEDURE — 36415 COLL VENOUS BLD VENIPUNCTURE: CPT | Performed by: PSYCHIATRY & NEUROLOGY

## 2023-04-25 PROCEDURE — 82565 ASSAY OF CREATININE: CPT | Performed by: PSYCHIATRY & NEUROLOGY

## 2023-04-25 RX ORDER — GABAPENTIN 300 MG/1
600 CAPSULE ORAL 2 TIMES DAILY
Qty: 360 CAPSULE | Refills: 3 | Status: SHIPPED | OUTPATIENT
Start: 2023-04-25 | End: 2024-04-30

## 2023-04-25 NOTE — LETTER
4/25/2023         RE: Kely Preston  [address] [address] [address]        Dear Colleague,    Thank you for referring your patient, Kely Preston, to the [facility]UROLOG[facility] [place]. Please see a copy of my visit note below.    Return visit for 47 year old woman with myelopathy. Symptoms are stable, with no new disability.       Mental state: Alert, appropriate, speech, language, and thought content normal.     Cranial nerves II-XII normal.    Sensory examination:     Right Left   Light touch prox forearm Normal   Vibration (timed) 8 8   Vibration (Rydell-Seiffer)     Temp     Pin prox forearm Normal   Pos     Legend:   MM = medial malleolus, TT = tibial tuberosity, K = patella, MCP = MCP joint  MF = mid-foot, DC = distal calf, MC = mid calf, PC = proximal calf      Motor examination:     Right Left   Shoulder abduction  5 5   Elbow extension 5 5   Elbow flexion 5 5   Wrist extension  5 5   Finger extension 5 5   FDI 5 5   APB 5 5   Hip flexion 5 5   Knee flexion 5 5   Knee extension 5 5   Dorsiflexion 5 5   Plantar flexion 5 5   A=atrophy    Tone normal     Reflexes:   Right Left   Biceps 2 2   BRD 2 2   Triceps 2 2   Mynor 2 2   Patellar 2 2   Achilles 2 2   Plantar Flexor Flexor   Clonus Absent Absent      Gait:  Normal.      Impression:  Stable examination.    Recommendations:  Refill gabapentin  Creatinine level  RTC 1 year      Fredi Day M.D.          Again, thank you for allowing me to participate in the care of your patient.        Sincerely,        Fredi Day MD

## 2023-04-25 NOTE — PROGRESS NOTES
Return visit for 47 year old woman with myelopathy. Symptoms are stable, with no new disability.       Mental state: Alert, appropriate, speech, language, and thought content normal.     Cranial nerves II-XII normal.    Sensory examination:     Right Left   Light touch prox forearm Normal   Vibration (timed) 8 8   Vibration (Rydell-Seiffer)     Temp     Pin prox forearm Normal   Pos     Legend:   MM = medial malleolus, TT = tibial tuberosity, K = patella, MCP = MCP joint  MF = mid-foot, DC = distal calf, MC = mid calf, PC = proximal calf      Motor examination:     Right Left   Shoulder abduction  5 5   Elbow extension 5 5   Elbow flexion 5 5   Wrist extension  5 5   Finger extension 5 5   FDI 5 5   APB 5 5   Hip flexion 5 5   Knee flexion 5 5   Knee extension 5 5   Dorsiflexion 5 5   Plantar flexion 5 5   A=atrophy    Tone normal     Reflexes:   Right Left   Biceps 2 2   BRD 2 2   Triceps 2 2   Mynor 2 2   Patellar 2 2   Achilles 2 2   Plantar Flexor Flexor   Clonus Absent Absent      Gait:  Normal.      Impression:  Stable examination.    Recommendations:  Refill gabapentin  Creatinine level  RTC 1 year      Fredi Day M.D.

## 2023-04-25 NOTE — NURSING NOTE
"Kely Preston's goals for this visit include:   Chief Complaint   Patient presents with     RECHECK     Neuropathic pain// follow up in 1 year        She requests these members of her care team be copied on today's visit information: yes    PCP: Pallas, Kenneth G    Referring Provider:  No referring provider defined for this encounter.    BP 95/61   Pulse 77   Ht 1.575 m (5' 2\")   Wt 55.3 kg (122 lb)   SpO2 98%   BMI 22.31 kg/m      Do you need any medication refills at today's visit? Yes  Gabapentin  CECIL Flynn, VJ ([facility])      "

## 2023-10-17 ENCOUNTER — TRANSFERRED RECORDS (OUTPATIENT)
Dept: HEALTH INFORMATION MANAGEMENT | Facility: CLINIC | Age: 48
End: 2023-10-17

## 2023-11-06 ENCOUNTER — TRANSFERRED RECORDS (OUTPATIENT)
Dept: HEALTH INFORMATION MANAGEMENT | Facility: CLINIC | Age: 48
End: 2023-11-06

## 2023-12-04 ENCOUNTER — TRANSFERRED RECORDS (OUTPATIENT)
Dept: HEALTH INFORMATION MANAGEMENT | Facility: CLINIC | Age: 48
End: 2023-12-04
Payer: COMMERCIAL

## 2024-02-06 ENCOUNTER — TRANSFERRED RECORDS (OUTPATIENT)
Dept: HEALTH INFORMATION MANAGEMENT | Facility: CLINIC | Age: 49
End: 2024-02-06
Payer: COMMERCIAL

## 2024-04-30 ENCOUNTER — TELEPHONE (OUTPATIENT)
Dept: NEUROLOGY | Facility: CLINIC | Age: 49
End: 2024-04-30

## 2024-04-30 ENCOUNTER — OFFICE VISIT (OUTPATIENT)
Dept: NEUROLOGY | Facility: CLINIC | Age: 49
End: 2024-04-30
Payer: COMMERCIAL

## 2024-04-30 VITALS
HEIGHT: 62 IN | DIASTOLIC BLOOD PRESSURE: 75 MMHG | SYSTOLIC BLOOD PRESSURE: 106 MMHG | BODY MASS INDEX: 22.45 KG/M2 | HEART RATE: 91 BPM | WEIGHT: 122 LBS

## 2024-04-30 DIAGNOSIS — M79.669 PAIN OF LOWER LEG, UNSPECIFIED LATERALITY: ICD-10-CM

## 2024-04-30 DIAGNOSIS — M79.2 NEUROPATHIC PAIN: Primary | ICD-10-CM

## 2024-04-30 LAB
CREAT SERPL-MCNC: 0.77 MG/DL (ref 0.51–0.95)
EGFRCR SERPLBLD CKD-EPI 2021: >90 ML/MIN/1.73M2
HBA1C MFR BLD: 5.6 % (ref 0–5.6)

## 2024-04-30 PROCEDURE — 83036 HEMOGLOBIN GLYCOSYLATED A1C: CPT | Performed by: PSYCHIATRY & NEUROLOGY

## 2024-04-30 PROCEDURE — 99214 OFFICE O/P EST MOD 30 MIN: CPT | Performed by: PSYCHIATRY & NEUROLOGY

## 2024-04-30 PROCEDURE — 82565 ASSAY OF CREATININE: CPT | Performed by: PSYCHIATRY & NEUROLOGY

## 2024-04-30 PROCEDURE — 36415 COLL VENOUS BLD VENIPUNCTURE: CPT | Performed by: PSYCHIATRY & NEUROLOGY

## 2024-04-30 RX ORDER — GABAPENTIN 300 MG/1
600 CAPSULE ORAL 2 TIMES DAILY
Qty: 360 CAPSULE | Refills: 3 | Status: SHIPPED | OUTPATIENT
Start: 2024-04-30

## 2024-04-30 RX ORDER — MIRABEGRON 50 MG/1
TABLET, FILM COATED, EXTENDED RELEASE ORAL
COMMUNITY
Start: 2024-03-05

## 2024-04-30 NOTE — TELEPHONE ENCOUNTER
Called and spoke with patient.Read off message from Dr. Day as follows:    Please call patient. Explain that I discussed her leg symptoms with a colleague. He suggested further assessment of non-spinal causes of intermittent leg pain. If she agrees, she should see them prior to the epidural steroid injection. Choices would be Dr Mishra, Dr Iyer, or Dr Dewitt. Dr Mishra may go to [place], which is closer to her home.     Patient said she was open to seeing a specialist (especially in [place]), but she has her injection on Monday, and would rather not push it out. Routed to Dr. Day for guidance on how to proceed.

## 2024-04-30 NOTE — PATIENT INSTRUCTIONS
Examination is stable.  If the epidural steroid is not beneficial, consider an orthopedic or sports medicine consultation.  I agree with making your next bladder stimulator MRI-compatible if possible.   Blood test today.  I will refill gabapentin, assuming a normal blood test.   Try reducing your gabapentin dose, to be sure you still need it. Alternatively, you can consider an extra dose mid-day if the benefit wears off.

## 2024-04-30 NOTE — Clinical Note
Please call patient. Explain that I discussed her leg symptoms with a colleague. He suggested further assessment of non-spinal causes of intermittent leg pain. If she agrees, she should see them prior to the epidural steroid injection. Choices would be Dr Mishra, Dr Iyer, or Dr Dewitt. Dr Mishra may go to [place], which is closer to her home.

## 2024-04-30 NOTE — NURSING NOTE
"Kely Preston's goals for this visit include:   Chief Complaint   Patient presents with    RECHECK     1 Year follow up        She requests these members of her care team be copied on today's visit information: yes    PCP: Pallas, Kenneth G    Referring Provider:  No referring provider defined for this encounter.    /75   Pulse 91   Ht 1.575 m (5' 2\")   Wt 55.3 kg (122 lb)   BMI 22.31 kg/m      Do you need any medication refills at today's visit? No  CECIL Flynn, CMA ([facility])        "

## 2024-04-30 NOTE — PROGRESS NOTES
Return visit for 48 year old woman with myelopathy. Symptoms are stable with regard to myelopathy. She has symptoms of lumbar radiculopathy, characterized by intermittent and unprovoked radiating pain from posterior thigh/buttock region to entire leg, sometimes with subjective numbness of entire limb. She is scheduled for an DARLENE. I personally reviewed imaging and imaging reports. Interestingly, there is no structural lesion on CT accounting for her radicular-type symptoms.       Mental state: Alert, appropriate, speech, language, and thought content normal.      Cranial nerves II-XII: normal.     Sensory examination:       Right Left   Light touch Reduced right forearm and hand Normal   Vibration (timed) 4 6   Vibration (Rydell-Seiffer)       Temp       Pin 20-30% reduction foot dorsum; reduced right hand and forearm normal   Pos       Legend:   MM = medial malleolus, TT = tibial tuberosity, K = patella, MCP = MCP joint  MF = mid-foot, DC = distal calf, MC = mid calf, PC = proximal calf        Motor examination:       Right Left   Shoulder abduction        5 5   Elbow extension 5 5   Elbow flexion 5 5   Wrist extension         5 5   Finger extension 5 5   FDI 5 5   APB 5 5   Hip flexion 5 5   Knee flexion 5 5   Knee extension 5 5   Dorsiflexion 5 5   Plantar flexion 5 5   A=atrophy    Ankle inversion/eversion 5/5 bilaterally     Tone normal     Reflexes:    Right Left   Biceps 2 2   BRD 2 2   Triceps 2 2   Patellar 2 2   Achilles 2 2   Plantar Flexor Flexor   Clonus Absent Absent      Coordination:  Finger-nose normal.  Heel-shin normal.  RRMs normal.     Gait:  Normal.     Impression and recommendations:  Examination is stable.  If the epidural steroid is not beneficial, consider an orthopedic or sports medicine consultation.  I agree with making your next bladder stimulator MRI-compatible if possible.   Blood test today.  I will refill gabapentin, assuming a normal blood test.   Try reducing your gabapentin dose,  to be sure you still need it. Alternatively, you can consider an extra dose mid-day if the benefit wears off.     Fredi Day M.D.    Addendum: discussed LE symptoms with medical spine colleague. He suggested further evaluation for MSK syndromes, despite symptom of numbness. Will offer a referral.    33 minutes spent on the date of the encounter on chart review, history and examination, documentation and further activities as noted above.

## 2024-04-30 NOTE — LETTER
4/30/2024         RE: Kely Preston  [address] [facility] [address]        Dear Colleague,    Thank you for referring your patient, Kely Preston, to the [facility] [place]. Please see a copy of my visit note below.    Return visit for 48 year old woman with myelopathy. Symptoms are stable with regard to myelopathy. She has symptoms of lumbar radiculopathy, characterized by intermittent and unprovoked radiating pain from posterior thigh/buttock region to entire leg, sometimes with subjective numbness of entire limb. She is scheduled for an DARLENE. I personally reviewed imaging and imaging reports. Interestingly, there is no structural lesion on CT accounting for her radicular-type symptoms.       Mental state: Alert, appropriate, speech, language, and thought content normal.      Cranial nerves II-XII: normal.     Sensory examination:       Right Left   Light touch Reduced right forearm and hand Normal   Vibration (timed) 4 6   Vibration (Rydell-Seiffer)       Temp       Pin 20-30% reduction foot dorsum; reduced right hand and forearm normal   Pos       Legend:   MM = medial malleolus, TT = tibial tuberosity, K = patella, MCP = MCP joint  MF = mid-foot, DC = distal calf, MC = mid calf, PC = proximal calf        Motor examination:       Right Left   Shoulder abduction        5 5   Elbow extension 5 5   Elbow flexion 5 5   Wrist extension         5 5   Finger extension 5 5   FDI 5 5   APB 5 5   Hip flexion 5 5   Knee flexion 5 5   Knee extension 5 5   Dorsiflexion 5 5   Plantar flexion 5 5   A=atrophy    Ankle inversion/eversion 5/5 bilaterally     Tone normal     Reflexes:    Right Left   Biceps 2 2   BRD 2 2   Triceps 2 2   Patellar 2 2   Achilles 2 2   Plantar Flexor Flexor   Clonus Absent Absent      Coordination:  Finger-nose normal.  Heel-shin normal.  RRMs normal.     Gait:  Normal.     Impression and recommendations:  Examination is stable.  If the epidural  steroid is not beneficial, consider an orthopedic or sports medicine consultation.  I agree with making your next bladder stimulator MRI-compatible if possible.   Blood test today.  I will refill gabapentin, assuming a normal blood test.   Try reducing your gabapentin dose, to be sure you still need it. Alternatively, you can consider an extra dose mid-day if the benefit wears off.     Fredi Day M.D.    Addendum: discussed LE symptoms with medical spine colleague. He suggested further evaluation for MSK syndromes, despite symptom of numbness. Will offer a referral.    33 minutes spent on the date of the encounter on chart review, history and examination, documentation and further activities as noted above.       Again, thank you for allowing me to participate in the care of your patient.        Sincerely,        Fredi Day MD

## 2024-06-09 ENCOUNTER — HEALTH MAINTENANCE LETTER (OUTPATIENT)
Age: 49
End: 2024-06-09

## 2025-03-31 ENCOUNTER — TRANSFERRED RECORDS (OUTPATIENT)
Dept: HEALTH INFORMATION MANAGEMENT | Facility: CLINIC | Age: 50
End: 2025-03-31

## 2025-04-29 ENCOUNTER — OFFICE VISIT (OUTPATIENT)
Dept: NEUROLOGY | Facility: CLINIC | Age: 50
End: 2025-04-29
Payer: COMMERCIAL

## 2025-04-29 VITALS
DIASTOLIC BLOOD PRESSURE: 81 MMHG | BODY MASS INDEX: 23.23 KG/M2 | WEIGHT: 127 LBS | HEART RATE: 93 BPM | SYSTOLIC BLOOD PRESSURE: 115 MMHG

## 2025-04-29 DIAGNOSIS — M79.2 NEUROPATHIC PAIN: Primary | ICD-10-CM

## 2025-04-29 DIAGNOSIS — M79.661 PAIN OF RIGHT LOWER LEG: ICD-10-CM

## 2025-04-29 PROCEDURE — 3074F SYST BP LT 130 MM HG: CPT | Performed by: PSYCHIATRY & NEUROLOGY

## 2025-04-29 PROCEDURE — 99213 OFFICE O/P EST LOW 20 MIN: CPT | Performed by: PSYCHIATRY & NEUROLOGY

## 2025-04-29 PROCEDURE — 3079F DIAST BP 80-89 MM HG: CPT | Performed by: PSYCHIATRY & NEUROLOGY

## 2025-04-29 NOTE — PROGRESS NOTES
Return visit for 49 year old woman with a myelopathy. Interval history: right shoulder and hip pain, both responding to injections. Also several months of paresthesias left forearm and hand dorsum, lasting for short periods of time (e.g., one hour). No precipitants. Leg pain still precipitated by walking long distances.        Relevant general physical and MSK features:  Pain right shoulder with passive external rotation.       Mental state: Alert, appropriate, speech, language, and thought content normal.      Cranial nerves II-XII: normal.     Sensory examination:       Right Left   Light touch Absent hand and forearm Normal   Vibration (timed) Elbow 6 Normal   Vibration (Rydell-Seiffer)       Temp       Pin Reduced hand and forearm Normal   Pos       Legend:   MM = medial malleolus, TT = tibial tuberosity, K = patella, MCP = MCP joint  MF = mid-foot, DC = distal calf, MC = mid calf, PC = proximal calf        Motor examination:       Right Left   Shoulder abduction        5 5   Elbow extension 5 5   Elbow flexion 5 5   Wrist extension         5 5   Finger extension 5 5   FDI 5 5   APB 5 5   Hip flexion 5 5   Knee flexion 5 5   Knee extension 5 5   Dorsiflexion 5 5   Plantar flexion 5 5   A=atrophy     Tone normal     Reflexes:    Right Left   Biceps 2 3   BRD 2 3   Triceps 2 2   Patellar 2 2   Achilles 2 1   Plantar Flexor Flexor   Clonus Absent Absent      Mynor's trace R, present L       Gait:  Normal.     Impression and recommendations:  Examination is stable.  Return 2 years or as needed if the pain clinic will take over the gabapentin prescription.     Fredi Day M.D.    25 minutes spent on the date of the encounter on chart review, history and examination, documentation and further activities as noted above.

## 2025-04-29 NOTE — LETTER
4/29/2025      Kely Preston  [address] [facility] [address]      Dear Colleague,    Thank you for referring your patient, Kely Preston, to the [facility] [place]. Please see a copy of my visit note below.    Return visit for 49 year old woman with a myelopathy. Interval history: right shoulder and hip pain, both responding to injections. Also several months of paresthesias left forearm and hand dorsum, lasting for short periods of time (e.g., one hour). No precipitants. Leg pain still precipitated by walking long distances.      Relevant general physical and MSK features:  Pain right shoulder with passive external rotation.       Mental state: Alert, appropriate, speech, language, and thought content normal.      Cranial nerves II-XII: normal.     Sensory examination:    Right Left   Light touch Absent hand and forearm Normal   Vibration (timed) Elbow 6 Normal   Vibration (Rydell-Seiffer)       Temp       Pin Reduced hand and forearm Normal   Pos       Legend:   MM = medial malleolus, TT = tibial tuberosity, K = patella, MCP = MCP joint  MF = mid-foot, DC = distal calf, MC = mid calf, PC = proximal calf        Motor examination:     Right Left   Shoulder abduction        5 5   Elbow extension 5 5   Elbow flexion 5 5   Wrist extension         5 5   Finger extension 5 5   FDI 5 5   APB 5 5   Hip flexion 5 5   Knee flexion 5 5   Knee extension 5 5   Dorsiflexion 5 5   Plantar flexion 5 5   A=atrophy     Tone normal     Reflexes:    Right Left   Biceps 2 3   BRD 2 3   Triceps 2 2   Patellar 2 2   Achilles 2 1   Plantar Flexor Flexor   Clonus Absent Absent      Mynor's trace R, present L    Gait:  Normal.     Impression and recommendations:  Examination is stable.  Return 2 years or as needed if the pain clinic will take over the gabapentin prescription.     Fredi Day M.D.    25 minutes spent on the date of the encounter on chart review, history and examination,  documentation and further activities as noted above.       Again, thank you for allowing me to participate in the care of your patient.        Sincerely,      Fredi Day MD    Electronically signed

## 2025-04-29 NOTE — PATIENT INSTRUCTIONS
Examination is stable.  Return 2 years or as needed if the pain clinic will take over the gabapentin prescription.

## 2025-06-15 ENCOUNTER — HEALTH MAINTENANCE LETTER (OUTPATIENT)
Age: 50
End: 2025-06-15

## (undated) DEVICE — DRAPE SHEET REV FOLD 3/4 9349

## (undated) DEVICE — SU VICRYL 4-0 PS-2 18" UND J496H

## (undated) DEVICE — ESU GROUND PAD UNIVERSAL W/O CORD

## (undated) DEVICE — PACK MINOR SBA15MIFSE

## (undated) DEVICE — BLADE KNIFE SURG 11 371111

## (undated) DEVICE — DRAPE C-ARM 60X42" 1013

## (undated) DEVICE — COVER CAMERA ENDOVIDEO

## (undated) DEVICE — SOL WATER IRRIG 1000ML BOTTLE 2F7114

## (undated) DEVICE — DRAPE BREAST/CHEST 29420

## (undated) DEVICE — PROGRAMMER MEDT SMART HANDSET W/COMMUNICATOR TH90G01

## (undated) DEVICE — PREP DURAPREP 26ML APL 8630

## (undated) DEVICE — DRAPE IOBAN INCISE 23X17" 6650EZ

## (undated) DEVICE — SOL ADH LIQUID BENZOIN SWAB 0.6ML C1544

## (undated) DEVICE — GLOVE PROTEXIS W/NEU-THERA 8.0  2D73TE80

## (undated) DEVICE — KIT ACCESSORY PERIPHERAL LEAD INTR 3550-18

## (undated) DEVICE — SU VICRYL 2-0 SH 27" J317H

## (undated) DEVICE — DRSG TEGADERM 4X4 3/4" 1626

## (undated) DEVICE — STIMULATOR EXTERNAL VERIFY MEDTRONIC INTERSTIM 353101

## (undated) DEVICE — CABLE TEST STIMULATION MEDTRONIC INTERSTIM 357501

## (undated) DEVICE — DRSG TEGADERM 2 1/2X 2 3/4"

## (undated) DEVICE — LINEN TOWEL PACK X5 5464

## (undated) RX ORDER — FENTANYL CITRATE 0.05 MG/ML
INJECTION, SOLUTION INTRAMUSCULAR; INTRAVENOUS
Status: DISPENSED
Start: 2019-10-30

## (undated) RX ORDER — LIDOCAINE HYDROCHLORIDE 20 MG/ML
INJECTION, SOLUTION EPIDURAL; INFILTRATION; INTRACAUDAL; PERINEURAL
Status: DISPENSED
Start: 2019-10-30

## (undated) RX ORDER — PROPOFOL 10 MG/ML
INJECTION, EMULSION INTRAVENOUS
Status: DISPENSED
Start: 2019-10-30

## (undated) RX ORDER — LIDOCAINE HYDROCHLORIDE AND EPINEPHRINE BITARTRATE 20; .01 MG/ML; MG/ML
INJECTION, SOLUTION SUBCUTANEOUS
Status: DISPENSED
Start: 2019-10-30

## (undated) RX ORDER — ONDANSETRON 2 MG/ML
INJECTION INTRAMUSCULAR; INTRAVENOUS
Status: DISPENSED
Start: 2019-10-30

## (undated) RX ORDER — CEFAZOLIN SODIUM 2 G/100ML
INJECTION, SOLUTION INTRAVENOUS
Status: DISPENSED
Start: 2019-10-30

## (undated) RX ORDER — DEXAMETHASONE SODIUM PHOSPHATE 4 MG/ML
INJECTION, SOLUTION INTRA-ARTICULAR; INTRALESIONAL; INTRAMUSCULAR; INTRAVENOUS; SOFT TISSUE
Status: DISPENSED
Start: 2019-10-30

## (undated) RX ORDER — GABAPENTIN 300 MG/1
CAPSULE ORAL
Status: DISPENSED
Start: 2019-10-30

## (undated) RX ORDER — FENTANYL CITRATE 50 UG/ML
INJECTION, SOLUTION INTRAMUSCULAR; INTRAVENOUS
Status: DISPENSED
Start: 2019-10-30

## (undated) RX ORDER — OXYCODONE HYDROCHLORIDE 5 MG/1
TABLET ORAL
Status: DISPENSED
Start: 2019-10-30

## (undated) RX ORDER — BUPIVACAINE HYDROCHLORIDE AND EPINEPHRINE 2.5; 5 MG/ML; UG/ML
INJECTION, SOLUTION EPIDURAL; INFILTRATION; INTRACAUDAL; PERINEURAL
Status: DISPENSED
Start: 2019-10-30